# Patient Record
Sex: FEMALE | Race: WHITE | NOT HISPANIC OR LATINO | Employment: OTHER | URBAN - METROPOLITAN AREA
[De-identification: names, ages, dates, MRNs, and addresses within clinical notes are randomized per-mention and may not be internally consistent; named-entity substitution may affect disease eponyms.]

---

## 2017-08-17 RX ORDER — LOSARTAN POTASSIUM 25 MG/1
25 TABLET ORAL DAILY
Qty: 30 TABLET | Refills: 11 | OUTPATIENT
Start: 2017-08-17 | End: 2017-09-21 | Stop reason: SDUPTHER

## 2017-09-21 RX ORDER — LOSARTAN POTASSIUM 25 MG/1
25 TABLET ORAL DAILY
Qty: 90 TABLET | Refills: 0 | Status: SHIPPED | OUTPATIENT
Start: 2017-09-21 | End: 2018-01-12 | Stop reason: SDUPTHER

## 2017-11-27 ENCOUNTER — CLINICAL SUPPORT (OUTPATIENT)
Dept: PRIMARY CARE CLINIC | Facility: CLINIC | Age: 77
End: 2017-11-27
Payer: MEDICARE

## 2017-11-27 DIAGNOSIS — J44.9 CHRONIC OBSTRUCTIVE PULMONARY DISEASE, UNSPECIFIED COPD TYPE: ICD-10-CM

## 2017-11-27 DIAGNOSIS — Z13.29 ENCOUNTER FOR SCREENING FOR OTHER SUSPECTED ENDOCRINE DISORDER: ICD-10-CM

## 2017-11-27 DIAGNOSIS — R73.9 HYPERGLYCEMIA: ICD-10-CM

## 2017-11-27 DIAGNOSIS — I10 ESSENTIAL HYPERTENSION, BENIGN: Primary | ICD-10-CM

## 2017-11-27 DIAGNOSIS — E78.2 MIXED HYPERLIPIDEMIA: ICD-10-CM

## 2017-11-27 LAB
ALBUMIN SERPL BCP-MCNC: 3.8 G/DL
ALP SERPL-CCNC: 95 U/L
ALT SERPL W/O P-5'-P-CCNC: 13 U/L
ANION GAP SERPL CALC-SCNC: 8 MMOL/L
AST SERPL-CCNC: 18 U/L
BASOPHILS # BLD AUTO: 0.08 K/UL
BASOPHILS NFR BLD: 1.2 %
BILIRUB SERPL-MCNC: 0.4 MG/DL
BUN SERPL-MCNC: 21 MG/DL
CALCIUM SERPL-MCNC: 9.6 MG/DL
CHLORIDE SERPL-SCNC: 108 MMOL/L
CHOLEST SERPL-MCNC: 161 MG/DL
CHOLEST/HDLC SERPL: 4.1 {RATIO}
CO2 SERPL-SCNC: 29 MMOL/L
CREAT SERPL-MCNC: 0.8 MG/DL
DIFFERENTIAL METHOD: ABNORMAL
EOSINOPHIL # BLD AUTO: 0.6 K/UL
EOSINOPHIL NFR BLD: 9.5 %
ERYTHROCYTE [DISTWIDTH] IN BLOOD BY AUTOMATED COUNT: 13.7 %
EST. GFR  (AFRICAN AMERICAN): >60 ML/MIN/1.73 M^2
EST. GFR  (NON AFRICAN AMERICAN): >60 ML/MIN/1.73 M^2
ESTIMATED AVG GLUCOSE: 117 MG/DL
GLUCOSE SERPL-MCNC: 103 MG/DL
HBA1C MFR BLD HPLC: 5.7 %
HCT VFR BLD AUTO: 40.3 %
HDLC SERPL-MCNC: 39 MG/DL
HDLC SERPL: 24.2 %
HGB BLD-MCNC: 12.6 G/DL
IMM GRANULOCYTES # BLD AUTO: 0.02 K/UL
IMM GRANULOCYTES NFR BLD AUTO: 0.3 %
LDLC SERPL CALC-MCNC: 88.4 MG/DL
LYMPHOCYTES # BLD AUTO: 1.3 K/UL
LYMPHOCYTES NFR BLD: 18.6 %
MCH RBC QN AUTO: 29 PG
MCHC RBC AUTO-ENTMCNC: 31.3 G/DL
MCV RBC AUTO: 93 FL
MONOCYTES # BLD AUTO: 0.7 K/UL
MONOCYTES NFR BLD: 10.4 %
NEUTROPHILS # BLD AUTO: 4.1 K/UL
NEUTROPHILS NFR BLD: 60 %
NONHDLC SERPL-MCNC: 122 MG/DL
NRBC BLD-RTO: 0 /100 WBC
PLATELET # BLD AUTO: 265 K/UL
PMV BLD AUTO: 10.5 FL
POTASSIUM SERPL-SCNC: 4.6 MMOL/L
PROT SERPL-MCNC: 7.4 G/DL
RBC # BLD AUTO: 4.35 M/UL
SODIUM SERPL-SCNC: 145 MMOL/L
TRIGL SERPL-MCNC: 168 MG/DL
TSH SERPL DL<=0.005 MIU/L-ACNC: 0.91 UIU/ML
WBC # BLD AUTO: 6.76 K/UL

## 2017-11-27 PROCEDURE — 84443 ASSAY THYROID STIM HORMONE: CPT

## 2017-11-27 PROCEDURE — 83036 HEMOGLOBIN GLYCOSYLATED A1C: CPT

## 2017-11-27 PROCEDURE — 85025 COMPLETE CBC W/AUTO DIFF WBC: CPT

## 2017-11-27 PROCEDURE — 80053 COMPREHEN METABOLIC PANEL: CPT

## 2017-11-27 PROCEDURE — 99999 PR PBB SHADOW E&M-EST. PATIENT-LVL II: CPT | Mod: PBBFAC,,,

## 2017-11-27 PROCEDURE — 80061 LIPID PANEL: CPT

## 2018-01-09 ENCOUNTER — OFFICE VISIT (OUTPATIENT)
Dept: PRIMARY CARE CLINIC | Facility: CLINIC | Age: 78
End: 2018-01-09
Payer: MEDICARE

## 2018-01-09 VITALS
WEIGHT: 133.38 LBS | DIASTOLIC BLOOD PRESSURE: 65 MMHG | SYSTOLIC BLOOD PRESSURE: 133 MMHG | RESPIRATION RATE: 18 BRPM | BODY MASS INDEX: 23.63 KG/M2 | TEMPERATURE: 98 F | HEIGHT: 63 IN | OXYGEN SATURATION: 87 % | HEART RATE: 73 BPM

## 2018-01-09 DIAGNOSIS — J44.9 CHRONIC OBSTRUCTIVE PULMONARY DISEASE, UNSPECIFIED COPD TYPE: ICD-10-CM

## 2018-01-09 DIAGNOSIS — M89.8X1 SHOULDER BLADE PAIN: ICD-10-CM

## 2018-01-09 DIAGNOSIS — R07.81 PLEURITIC CHEST PAIN: Primary | ICD-10-CM

## 2018-01-09 PROCEDURE — 99999 PR PBB SHADOW E&M-EST. PATIENT-LVL IV: CPT | Mod: PBBFAC,,, | Performed by: INTERNAL MEDICINE

## 2018-01-09 PROCEDURE — 96372 THER/PROPH/DIAG INJ SC/IM: CPT | Mod: S$GLB,,, | Performed by: INTERNAL MEDICINE

## 2018-01-09 PROCEDURE — 99213 OFFICE O/P EST LOW 20 MIN: CPT | Mod: 25,S$GLB,, | Performed by: INTERNAL MEDICINE

## 2018-01-09 RX ORDER — BETAMETHASONE SODIUM PHOSPHATE AND BETAMETHASONE ACETATE 3; 3 MG/ML; MG/ML
12 INJECTION, SUSPENSION INTRA-ARTICULAR; INTRALESIONAL; INTRAMUSCULAR; SOFT TISSUE
Status: COMPLETED | OUTPATIENT
Start: 2018-01-09 | End: 2018-01-09

## 2018-01-09 RX ORDER — PREDNISONE 20 MG/1
20 TABLET ORAL 2 TIMES DAILY
Qty: 14 TABLET | Refills: 0 | Status: SHIPPED | OUTPATIENT
Start: 2018-01-09 | End: 2018-01-16

## 2018-01-09 RX ORDER — KETOROLAC TROMETHAMINE 30 MG/ML
30 INJECTION, SOLUTION INTRAMUSCULAR; INTRAVENOUS
Status: COMPLETED | OUTPATIENT
Start: 2018-01-09 | End: 2018-01-09

## 2018-01-09 RX ADMIN — BETAMETHASONE SODIUM PHOSPHATE AND BETAMETHASONE ACETATE 12 MG: 3; 3 INJECTION, SUSPENSION INTRA-ARTICULAR; INTRALESIONAL; INTRAMUSCULAR; SOFT TISSUE at 10:01

## 2018-01-09 RX ADMIN — KETOROLAC TROMETHAMINE 30 MG: 30 INJECTION, SOLUTION INTRAMUSCULAR; INTRAVENOUS at 10:01

## 2018-01-09 NOTE — PROGRESS NOTES
Patient ID by name and . Celestone 12 mg IM injection given in right ventrogluteal and Ketorolac 30 mg IM injection given in left ventrogluteal using aseptic technique. Aspirated with no blood noted. Patient tolerated well. Given per physicians order. No adverse reactions noted.

## 2018-01-11 NOTE — PROGRESS NOTES
Subjective:       Patient ID: Ann Marie Hirsch is a 77 y.o. female.    Chief Complaint: Routine check up and ( R ) Shoulder blade pain    HPI  Pt c/o rt shoulder blade pain in the back radiate rt arm had CABG 2015 no sob cp hurt when deep breath or move rt shoulder went to ER had xray labs yold CXR ok sned home has been hurting several weeks not better  Review of Systems    Objective:      Physical Exam   Constitutional: She is oriented to person, place, and time. She appears well-developed and well-nourished. No distress.   HENT:   Head: Normocephalic and atraumatic.   Right Ear: External ear normal.   Left Ear: External ear normal.   Nose: Nose normal.   Mouth/Throat: Oropharynx is clear and moist. No oropharyngeal exudate.   Eyes: Conjunctivae and EOM are normal. Pupils are equal, round, and reactive to light. Right eye exhibits no discharge. Left eye exhibits no discharge.   Neck: Normal range of motion. Neck supple. No thyromegaly present.   Cardiovascular: Normal rate, regular rhythm, normal heart sounds and intact distal pulses.  Exam reveals no gallop and no friction rub.    No murmur heard.  Pulmonary/Chest: Effort normal and breath sounds normal. No respiratory distress. She has no wheezes. She has no rales. She exhibits no tenderness.   Abdominal: Soft. Bowel sounds are normal. She exhibits no distension. There is no tenderness. There is no rebound and no guarding.   Musculoskeletal: Normal range of motion. She exhibits tenderness (tenderness medial shoulder blade with palpation no swell no redness). She exhibits no edema or deformity.   Lymphadenopathy:     She has no cervical adenopathy.   Neurological: She is alert and oriented to person, place, and time.   Skin: Skin is warm and dry. Capillary refill takes less than 2 seconds. No rash noted. No erythema.   Psychiatric: She has a normal mood and affect. Judgment and thought content normal.   Nursing note and vitals reviewed.      Assessment:       1.  Pleuritic chest pain        Plan:       Pleuritic chest pain  -     betamethasone acetate-betamethasone sodium phosphate injection 12 mg; Inject 2 mLs (12 mg total) into the muscle one time.  -     ketorolac injection 30 mg; Inject 30 mg into the muscle one time.  -     predniSONE (DELTASONE) 20 MG tablet; Take 1 tablet (20 mg total) by mouth 2 (two) times daily.  Dispense: 14 tablet; Refill: 0

## 2018-01-12 NOTE — TELEPHONE ENCOUNTER
----- Message from Felicity Cabrera sent at 1/12/2018 12:13 PM CST -----  Contact: Patient  Ann Marie, patient [phone], Calling for refill on Rx losartan (COZAAR) 25 MG tablet 90 tablet. Please advise. Thanks.      [facility] - [facility], [place] - [address]  [address] [facility] [address]  Phone: [phone] Fax: [phone]

## 2018-01-13 RX ORDER — LOSARTAN POTASSIUM 25 MG/1
25 TABLET ORAL DAILY
Qty: 90 TABLET | Refills: 0 | Status: SHIPPED | OUTPATIENT
Start: 2018-01-13 | End: 2018-04-09 | Stop reason: SDUPTHER

## 2018-04-09 ENCOUNTER — OFFICE VISIT (OUTPATIENT)
Dept: PRIMARY CARE CLINIC | Facility: CLINIC | Age: 78
End: 2018-04-09
Payer: MEDICARE

## 2018-04-09 VITALS
SYSTOLIC BLOOD PRESSURE: 135 MMHG | BODY MASS INDEX: 23.9 KG/M2 | DIASTOLIC BLOOD PRESSURE: 78 MMHG | HEART RATE: 73 BPM | TEMPERATURE: 99 F | HEIGHT: 63 IN | OXYGEN SATURATION: 93 % | RESPIRATION RATE: 18 BRPM | WEIGHT: 134.88 LBS

## 2018-04-09 DIAGNOSIS — Z95.1 S/P CABG (CORONARY ARTERY BYPASS GRAFT): ICD-10-CM

## 2018-04-09 DIAGNOSIS — J44.9 CHRONIC OBSTRUCTIVE PULMONARY DISEASE, UNSPECIFIED COPD TYPE: ICD-10-CM

## 2018-04-09 DIAGNOSIS — I10 ESSENTIAL HYPERTENSION, BENIGN: Primary | ICD-10-CM

## 2018-04-09 PROCEDURE — 99213 OFFICE O/P EST LOW 20 MIN: CPT | Mod: S$GLB,,, | Performed by: INTERNAL MEDICINE

## 2018-04-09 PROCEDURE — 99999 PR PBB SHADOW E&M-EST. PATIENT-LVL III: CPT | Mod: PBBFAC,,, | Performed by: INTERNAL MEDICINE

## 2018-04-09 PROCEDURE — 3075F SYST BP GE 130 - 139MM HG: CPT | Mod: CPTII,S$GLB,, | Performed by: INTERNAL MEDICINE

## 2018-04-09 PROCEDURE — 3078F DIAST BP <80 MM HG: CPT | Mod: CPTII,S$GLB,, | Performed by: INTERNAL MEDICINE

## 2018-04-09 RX ORDER — LOSARTAN POTASSIUM 25 MG/1
25 TABLET ORAL DAILY
Qty: 90 TABLET | Refills: 3 | Status: SHIPPED | OUTPATIENT
Start: 2018-04-09 | End: 2019-01-11 | Stop reason: SDUPTHER

## 2018-04-09 NOTE — PROGRESS NOTES
Subjective:       Patient ID: Ann Marie Hirsch is a 78 y.o. female.    Chief Complaint: Annual Exam    HPI  Pt c/o doing well no new problems try get off oxygen but desaturation after 5 minutes not able to afford Symbicort CVXR better last visit does not want to do mammogram hurt too much no sob cp no fever chill no new c/o pt nee refill on BP med and see Dr Andrade for cardiac had CABG 2015 stable  Review of Systems    Objective:      Physical Exam   Constitutional: She is oriented to person, place, and time. She appears well-developed and well-nourished. No distress.   On portable oxygen   HENT:   Head: Normocephalic and atraumatic.   Right Ear: External ear normal.   Left Ear: External ear normal.   Nose: Nose normal.   Mouth/Throat: Oropharynx is clear and moist. No oropharyngeal exudate.   Eyes: Conjunctivae and EOM are normal. Pupils are equal, round, and reactive to light. Right eye exhibits no discharge. Left eye exhibits no discharge.   Neck: Normal range of motion. Neck supple. No thyromegaly present.   Cardiovascular: Normal rate, regular rhythm, normal heart sounds and intact distal pulses.  Exam reveals no gallop and no friction rub.    No murmur heard.  Pulmonary/Chest: Effort normal. No respiratory distress. She has no wheezes. She has no rales. She exhibits no tenderness.   Decrease BS bilaterally   Abdominal: Soft. Bowel sounds are normal. She exhibits no distension. There is no tenderness. There is no rebound and no guarding.   Musculoskeletal: Normal range of motion. She exhibits no edema, tenderness or deformity.   Lymphadenopathy:     She has no cervical adenopathy.   Neurological: She is alert and oriented to person, place, and time.   Skin: Skin is warm and dry. Capillary refill takes less than 2 seconds. No rash noted. No erythema.   Psychiatric: She has a normal mood and affect. Judgment and thought content normal.   Nursing note and vitals reviewed.      Assessment:       1. Essential  hypertension, benign    2. Chronic obstructive pulmonary disease, unspecified COPD type    3. S/P CABG (coronary artery bypass graft)        Plan:       Essential hypertension, benign  -     losartan (COZAAR) 25 MG tablet; Take 1 tablet (25 mg total) by mouth once daily.  Dispense: 90 tablet; Refill: 3    Chronic obstructive pulmonary disease, unspecified COPD type  Comments:  neeed to find out which inhaler ijnsurance cover for COPD exacerbation prevention    S/P CABG (coronary artery bypass graft)  Comments:  f/u with Dr Al

## 2018-07-16 ENCOUNTER — OFFICE VISIT (OUTPATIENT)
Dept: PRIMARY CARE CLINIC | Facility: CLINIC | Age: 78
End: 2018-07-16
Payer: MEDICARE

## 2018-07-16 VITALS
HEART RATE: 67 BPM | HEIGHT: 63 IN | BODY MASS INDEX: 24.51 KG/M2 | DIASTOLIC BLOOD PRESSURE: 73 MMHG | RESPIRATION RATE: 18 BRPM | TEMPERATURE: 98 F | OXYGEN SATURATION: 96 % | SYSTOLIC BLOOD PRESSURE: 116 MMHG | WEIGHT: 138.31 LBS

## 2018-07-16 DIAGNOSIS — Z23 NEED FOR SHINGLES VACCINE: Primary | ICD-10-CM

## 2018-07-16 DIAGNOSIS — R20.2 PARESTHESIAS: ICD-10-CM

## 2018-07-16 PROCEDURE — 99213 OFFICE O/P EST LOW 20 MIN: CPT | Mod: S$GLB,,, | Performed by: INTERNAL MEDICINE

## 2018-07-16 PROCEDURE — 3074F SYST BP LT 130 MM HG: CPT | Mod: CPTII,S$GLB,, | Performed by: INTERNAL MEDICINE

## 2018-07-16 PROCEDURE — 99999 PR PBB SHADOW E&M-EST. PATIENT-LVL III: CPT | Mod: PBBFAC,,, | Performed by: INTERNAL MEDICINE

## 2018-07-16 PROCEDURE — 3078F DIAST BP <80 MM HG: CPT | Mod: CPTII,S$GLB,, | Performed by: INTERNAL MEDICINE

## 2018-07-17 NOTE — PROGRESS NOTES
Subjective:       Patient ID: Ann Marie Hirsch is a 78 y.o. female.    Chief Complaint: Burning sensation on abdomen    HPI  Pt  C/o  c/o sever burning sensation rt side below rt breast 2 weeks ago no rash no heat had cholecystectomy no sob cp no fever chill  Review of Systems    Objective:      Physical Exam   Constitutional: She is oriented to person, place, and time. She appears well-developed and well-nourished. No distress.   HENT:   Head: Normocephalic and atraumatic.   Right Ear: External ear normal.   Left Ear: External ear normal.   Nose: Nose normal.   Mouth/Throat: Oropharynx is clear and moist. No oropharyngeal exudate.   Eyes: Conjunctivae and EOM are normal. Pupils are equal, round, and reactive to light. Right eye exhibits no discharge. Left eye exhibits no discharge.   Neck: Normal range of motion. Neck supple. No thyromegaly present.   Cardiovascular: Normal rate, regular rhythm, normal heart sounds and intact distal pulses.  Exam reveals no gallop and no friction rub.    No murmur heard.  Pulmonary/Chest: Effort normal and breath sounds normal. No respiratory distress. She has no wheezes. She has no rales. She exhibits no tenderness.   Home oxygen   Abdominal: Soft. Bowel sounds are normal. She exhibits no distension. There is no tenderness. There is no rebound and no guarding.   Musculoskeletal: Normal range of motion. She exhibits no edema, tenderness or deformity.   Lymphadenopathy:     She has no cervical adenopathy.   Neurological: She is alert and oriented to person, place, and time.   Skin: Skin is warm and dry. Capillary refill takes less than 2 seconds. No rash noted. No erythema.   Rt breast rt chest no rash no pain back normal   Psychiatric: She has a normal mood and affect. Judgment and thought content normal.   Nursing note and vitals reviewed.      Assessment:       1. Need for shingles vaccine    2. Paresthesias        Plan:       Need for shingles vaccine  -     Discontinue: zoster  vaccine live, PF, (ZOSTAVAX, PF,) 19,400 unit/0.65 mL injection; Inject 19,400 Units into the skin once. for 1 dose  Dispense: 1 vial; Refill: 0  -     zoster vaccine live, PF, (ZOSTAVAX, PF,) 19,400 unit/0.65 mL injection; Inject 19,400 Units into the skin once. for 1 dose  Dispense: 1 vial; Refill: 0    Paresthesias  Comments:  rt lower chest since symptoms resolved and no rash no pain will observe for now and get Herpeszoster vaccib=eninsurance only cover pharmacy

## 2019-01-11 DIAGNOSIS — I10 ESSENTIAL HYPERTENSION, BENIGN: ICD-10-CM

## 2019-01-11 RX ORDER — LOSARTAN POTASSIUM 25 MG/1
TABLET ORAL
Qty: 90 TABLET | Refills: 3 | Status: SHIPPED | OUTPATIENT
Start: 2019-01-11 | End: 2023-04-26

## 2019-12-17 ENCOUNTER — TELEPHONE (OUTPATIENT)
Dept: PULMONOLOGY | Facility: CLINIC | Age: 79
End: 2019-12-17

## 2019-12-17 DIAGNOSIS — J44.9 CHRONIC OBSTRUCTIVE PULMONARY DISEASE, UNSPECIFIED COPD TYPE: Primary | ICD-10-CM

## 2019-12-24 ENCOUNTER — PATIENT OUTREACH (OUTPATIENT)
Dept: ADMINISTRATIVE | Facility: OTHER | Age: 79
End: 2019-12-24

## 2019-12-27 ENCOUNTER — TELEPHONE (OUTPATIENT)
Dept: PULMONOLOGY | Facility: CLINIC | Age: 79
End: 2019-12-27

## 2019-12-27 ENCOUNTER — OFFICE VISIT (OUTPATIENT)
Dept: PULMONOLOGY | Facility: CLINIC | Age: 79
End: 2019-12-27
Payer: MEDICARE

## 2019-12-27 VITALS
DIASTOLIC BLOOD PRESSURE: 67 MMHG | WEIGHT: 137.88 LBS | OXYGEN SATURATION: 92 % | SYSTOLIC BLOOD PRESSURE: 128 MMHG | HEIGHT: 63 IN | BODY MASS INDEX: 24.43 KG/M2 | HEART RATE: 73 BPM

## 2019-12-27 DIAGNOSIS — J43.8 OTHER EMPHYSEMA: ICD-10-CM

## 2019-12-27 DIAGNOSIS — J96.11 CHRONIC RESPIRATORY FAILURE WITH HYPOXIA: ICD-10-CM

## 2019-12-27 PROCEDURE — 3078F DIAST BP <80 MM HG: CPT | Mod: CPTII,S$GLB,, | Performed by: NURSE PRACTITIONER

## 2019-12-27 PROCEDURE — 99204 PR OFFICE/OUTPT VISIT, NEW, LEVL IV, 45-59 MIN: ICD-10-PCS | Mod: S$GLB,,, | Performed by: NURSE PRACTITIONER

## 2019-12-27 PROCEDURE — 99999 PR PBB SHADOW E&M-EST. PATIENT-LVL III: CPT | Mod: PBBFAC,,, | Performed by: NURSE PRACTITIONER

## 2019-12-27 PROCEDURE — 3074F SYST BP LT 130 MM HG: CPT | Mod: CPTII,S$GLB,, | Performed by: NURSE PRACTITIONER

## 2019-12-27 PROCEDURE — 1159F PR MEDICATION LIST DOCUMENTED IN MEDICAL RECORD: ICD-10-PCS | Mod: S$GLB,,, | Performed by: NURSE PRACTITIONER

## 2019-12-27 PROCEDURE — 99204 OFFICE O/P NEW MOD 45 MIN: CPT | Mod: S$GLB,,, | Performed by: NURSE PRACTITIONER

## 2019-12-27 PROCEDURE — 1126F PR PAIN SEVERITY QUANTIFIED, NO PAIN PRESENT: ICD-10-PCS | Mod: S$GLB,,, | Performed by: NURSE PRACTITIONER

## 2019-12-27 PROCEDURE — 1159F MED LIST DOCD IN RCRD: CPT | Mod: S$GLB,,, | Performed by: NURSE PRACTITIONER

## 2019-12-27 PROCEDURE — 1101F PR PT FALLS ASSESS DOC 0-1 FALLS W/OUT INJ PAST YR: ICD-10-PCS | Mod: CPTII,S$GLB,, | Performed by: NURSE PRACTITIONER

## 2019-12-27 PROCEDURE — 1101F PT FALLS ASSESS-DOCD LE1/YR: CPT | Mod: CPTII,S$GLB,, | Performed by: NURSE PRACTITIONER

## 2019-12-27 PROCEDURE — 3074F PR MOST RECENT SYSTOLIC BLOOD PRESSURE < 130 MM HG: ICD-10-PCS | Mod: CPTII,S$GLB,, | Performed by: NURSE PRACTITIONER

## 2019-12-27 PROCEDURE — 99999 PR PBB SHADOW E&M-EST. PATIENT-LVL III: ICD-10-PCS | Mod: PBBFAC,,, | Performed by: NURSE PRACTITIONER

## 2019-12-27 PROCEDURE — 3078F PR MOST RECENT DIASTOLIC BLOOD PRESSURE < 80 MM HG: ICD-10-PCS | Mod: CPTII,S$GLB,, | Performed by: NURSE PRACTITIONER

## 2019-12-27 PROCEDURE — 1126F AMNT PAIN NOTED NONE PRSNT: CPT | Mod: S$GLB,,, | Performed by: NURSE PRACTITIONER

## 2019-12-27 RX ORDER — ROSUVASTATIN CALCIUM 10 MG/1
TABLET, COATED ORAL
COMMUNITY
Start: 2019-12-17 | End: 2023-07-24

## 2019-12-27 RX ORDER — OMEPRAZOLE 20 MG/1
20 CAPSULE, DELAYED RELEASE ORAL DAILY
Refills: 3 | COMMUNITY
Start: 2019-12-06 | End: 2023-04-26 | Stop reason: SDUPTHER

## 2019-12-27 RX ORDER — NITROGLYCERIN 0.4 MG/1
TABLET SUBLINGUAL
Refills: 3 | COMMUNITY
Start: 2019-11-15 | End: 2023-11-28 | Stop reason: SDUPTHER

## 2019-12-27 NOTE — PATIENT INSTRUCTIONS
Try using the Stiolto 2 puffs once daily  Let me know if it is not covered by insurance or too expensive.    Call us at [phone] and ask for Satish or Chery if you have any problems

## 2019-12-27 NOTE — TELEPHONE ENCOUNTER
----- Message from Katerin Johnson sent at 12/27/2019  2:04 PM CST -----  Contact: Self [phone]  Pt states her medication was $45 tiotropium-olodaterol (STIOLTO RESPIMAT) 2.5-2.5 mcg/actuation Mist please call back to discuss

## 2019-12-27 NOTE — LETTER
December 27, 2019      Nahum Chaney MD  [address] [place] [address]           [facility]  [address] [facility] [address]  [place] [address]  Phone: [phone]  Fax: [phone]          Patient: Ann Marie Hirsch   MR Number: [id number]   YOB: 1940   Date of Visit: 12/27/2019       Dear Dr. Nahum Chaney:    Thank you for referring Ann Marie Hirsch to me for evaluation. Attached you will find relevant portions of my assessment and plan of care.    If you have questions, please do not hesitate to call me. I look forward to following Ann Marie Hirsch along with you.    Sincerely,    Nga Martinez, DNP    Enclosure  CC:  No Recipients    If you would like to receive this communication electronically, please contact [email or web address] or [phone] to request more information on [employer] Link access.    For providers and/or their staff who would like to refer a patient to [facility], please contact us through our one-stop-shop provider referral line, [facility], at [phone].    If you feel you have received this communication in error or would no longer like to receive these types of communications, please e-mail [email or web address]

## 2019-12-27 NOTE — ASSESSMENT & PLAN NOTE
Trial Stiolto.  No recent need for steroids so will forgo ICS at this time. Tried to obtain coupon but patient has no email address to provide.

## 2019-12-27 NOTE — PROGRESS NOTES
Subjective:       Patient ID: Ann Marie Hirsch is a 79 y.o. female.    Chief Complaint: COPD    HPI:   Ann Marie Hirsch is a 79 y.o. female who presents with evaluation for COPD.  Seen by Dr. Mckeon in the past but is new to me.   On supplemental oxygen for 2 years-laila.  Had CABG in ,  Started smoking at age 15, quit 2013 about a pack a day.  No lung problems personally or in the family.   Does fluctuate by a couple of pounds.  Uses nebulizer twice a day with no relief  Had PFTs a couple of years ago and was prescribed inhalers but they were not covered by insurance.    No needs for steroids or abx.   Not interested in completing PFTs at this time.       Review of Systems   Constitutional: Negative for chills, activity change, fatigue and night sweats.   HENT: Negative for postnasal drip, rhinorrhea, trouble swallowing and congestion.    Eyes: Negative for itching.   Respiratory: Positive for dyspnea on extertion (with even light housework or long walking). Negative for cough, hemoptysis, sputum production, choking, chest tightness, shortness of breath, wheezing and use of rescue inhaler.    Cardiovascular: Negative for chest pain and palpitations.   Genitourinary: Negative for difficulty urinating.   Endocrine: Negative for cold intolerance and heat intolerance.    Musculoskeletal: Negative for arthralgias.   Skin: Negative for rash.   Gastrointestinal: Negative for nausea, vomiting and acid reflux.   Neurological: Negative for dizziness and light-headedness.   Hematological: Negative for adenopathy.   Psychiatric/Behavioral: Negative for sleep disturbance.         Social History     Tobacco Use    Smoking status: Former Smoker     Last attempt to quit: 6/3/2012     Years since quittin.5    Smokeless tobacco: Never Used   Substance Use Topics    Alcohol use: No       Review of patient's allergies indicates:  No Known Allergies  Past Medical History:   Diagnosis Date    COPD (chronic obstructive pulmonary  disease)     Hyperlipidemia     Hypertension      Past Surgical History:   Procedure Laterality Date    gall stone      HEMORRHOID SURGERY      open heart surgery       Current Outpatient Medications on File Prior to Visit   Medication Sig    aspirin (ECOTRIN) 81 MG EC tablet Take 81 mg by mouth once daily.    ipratropium (ATROVENT) 0.02 % nebulizer solution     levalbuterol (XOPENEX) 1.25 mg/3 mL nebulizer solution     losartan (COZAAR) 25 MG tablet TAKE ONE TABLET BY MOUTH ONCE DAILY    metoprolol succinate (TOPROL-XL) 50 MG 24 hr tablet     nitroGLYCERIN (NITROSTAT) 0.4 MG SL tablet PLACE 1 TAB UNDER TONGUE FOR CHEST PAIN EVERY 5 MINUTES UP TO 3 DOSES GO TO ER IF PAIN NOT RELIEVED    omeprazole (PRILOSEC) 20 MG capsule Take 20 mg by mouth once daily.    ranitidine (ZANTAC) 150 MG capsule Take 150 mg by mouth 2 (two) times daily.    rosuvastatin (CRESTOR) 10 MG tablet     lovastatin (MEVACOR) 20 MG tablet      No current facility-administered medications on file prior to visit.        Objective:      Vitals:    12/27/19 0946   BP: 128/67   Pulse: 73     Physical Exam   Constitutional: She is oriented to person, place, and time. She appears well-developed and well-nourished. No distress.   On supplemental oxygen   HENT:   Head: Normocephalic.   Neck: Normal range of motion. Neck supple.   Cardiovascular: Normal rate and regular rhythm.   No murmur heard.  Pulmonary/Chest: Normal expansion, symmetric chest wall expansion and effort normal. No respiratory distress. She has decreased breath sounds. She has no wheezes. She has no rhonchi. She has no rales.   Abdominal: Soft. She exhibits no distension. There is no hepatosplenomegaly. There is no tenderness.   Musculoskeletal: Normal range of motion. She exhibits no edema.   Lymphadenopathy:     She has no cervical adenopathy.   Neurological: She is alert and oriented to person, place, and time. Gait normal.   Skin: Skin is warm and dry. No cyanosis.  Nails show no clubbing.   Psychiatric: She has a normal mood and affect.   Vitals reviewed.    Personal Diagnostic Review  PFTs personally reviewed and discussed with patient from 2016  Imaging personally reviewed with patient CXR 4/8/19        Pulmonary Function Tests 12/27/2019   SpO2 92   Height 63.000   Weight 2206.36   BMI (Calculated) 24.4   Some recent data might be hidden         Assessment:     Problem List Items Addressed This Visit        Pulmonary    Other emphysema    Overview     Using PRN nebs, no maintenance therapy in a few years.  Tried Anoro- unable to afford  Not interested in completing PFTs         Current Assessment & Plan     Trial Stiolto.  No recent need for steroids so will forgo ICS at this time. Tried to obtain coupon but patient has no email address to provide.          Chronic respiratory failure with hypoxia    Overview     On 2-3 L NC         Current Assessment & Plan     Continue as now             Patient ahs not had a CT chest to screen for cancer, will discuss at next visit.

## 2019-12-30 ENCOUNTER — TELEPHONE (OUTPATIENT)
Dept: PULMONOLOGY | Facility: CLINIC | Age: 79
End: 2019-12-30

## 2020-04-30 ENCOUNTER — TELEPHONE (OUTPATIENT)
Dept: PULMONOLOGY | Facility: CLINIC | Age: 80
End: 2020-04-30

## 2020-05-08 ENCOUNTER — TELEPHONE (OUTPATIENT)
Dept: PULMONOLOGY | Facility: CLINIC | Age: 80
End: 2020-05-08

## 2020-05-08 NOTE — TELEPHONE ENCOUNTER
The pt states that she would like a call on Monday when her daughter present to set the appointment.

## 2020-06-19 ENCOUNTER — OFFICE VISIT (OUTPATIENT)
Dept: PULMONOLOGY | Facility: CLINIC | Age: 80
End: 2020-06-19
Payer: MEDICARE

## 2020-06-19 VITALS — WEIGHT: 133.69 LBS | OXYGEN SATURATION: 99 % | HEIGHT: 63 IN | HEART RATE: 64 BPM | BODY MASS INDEX: 23.69 KG/M2

## 2020-06-19 DIAGNOSIS — J96.11 CHRONIC RESPIRATORY FAILURE WITH HYPOXIA: ICD-10-CM

## 2020-06-19 DIAGNOSIS — J43.8 OTHER EMPHYSEMA: ICD-10-CM

## 2020-06-19 DIAGNOSIS — R06.00 DYSPNEA, UNSPECIFIED TYPE: ICD-10-CM

## 2020-06-19 PROCEDURE — 1159F MED LIST DOCD IN RCRD: CPT | Mod: S$GLB,,, | Performed by: NURSE PRACTITIONER

## 2020-06-19 PROCEDURE — 1101F PT FALLS ASSESS-DOCD LE1/YR: CPT | Mod: CPTII,S$GLB,, | Performed by: NURSE PRACTITIONER

## 2020-06-19 PROCEDURE — 1159F PR MEDICATION LIST DOCUMENTED IN MEDICAL RECORD: ICD-10-PCS | Mod: S$GLB,,, | Performed by: NURSE PRACTITIONER

## 2020-06-19 PROCEDURE — 99999 PR PBB SHADOW E&M-EST. PATIENT-LVL IV: ICD-10-PCS | Mod: PBBFAC,,, | Performed by: NURSE PRACTITIONER

## 2020-06-19 PROCEDURE — 99213 PR OFFICE/OUTPT VISIT, EST, LEVL III, 20-29 MIN: ICD-10-PCS | Mod: S$GLB,,, | Performed by: NURSE PRACTITIONER

## 2020-06-19 PROCEDURE — 99213 OFFICE O/P EST LOW 20 MIN: CPT | Mod: S$GLB,,, | Performed by: NURSE PRACTITIONER

## 2020-06-19 PROCEDURE — 1101F PR PT FALLS ASSESS DOC 0-1 FALLS W/OUT INJ PAST YR: ICD-10-PCS | Mod: CPTII,S$GLB,, | Performed by: NURSE PRACTITIONER

## 2020-06-19 PROCEDURE — 1126F PR PAIN SEVERITY QUANTIFIED, NO PAIN PRESENT: ICD-10-PCS | Mod: S$GLB,,, | Performed by: NURSE PRACTITIONER

## 2020-06-19 PROCEDURE — 99999 PR PBB SHADOW E&M-EST. PATIENT-LVL IV: CPT | Mod: PBBFAC,,, | Performed by: NURSE PRACTITIONER

## 2020-06-19 PROCEDURE — 1126F AMNT PAIN NOTED NONE PRSNT: CPT | Mod: S$GLB,,, | Performed by: NURSE PRACTITIONER

## 2020-06-19 RX ORDER — FLUTICASONE FUROATE, UMECLIDINIUM BROMIDE AND VILANTEROL TRIFENATATE 100; 62.5; 25 UG/1; UG/1; UG/1
POWDER RESPIRATORY (INHALATION)
Status: ON HOLD | COMMUNITY
Start: 2020-06-16 | End: 2023-01-02

## 2020-06-19 RX ORDER — ALBUTEROL SULFATE 90 UG/1
1 AEROSOL, METERED RESPIRATORY (INHALATION) DAILY
Qty: 18 G | Refills: 6 | Status: SHIPPED | OUTPATIENT
Start: 2020-06-19 | End: 2023-04-26

## 2020-06-19 RX ORDER — PREDNISONE 20 MG/1
TABLET ORAL
Status: ON HOLD | COMMUNITY
Start: 2020-06-16 | End: 2023-01-04 | Stop reason: HOSPADM

## 2020-06-19 NOTE — PROGRESS NOTES
Subjective:       Patient ID: Ann Marie Hirsch is a 80 y.o. female.    Chief Complaint: Follow-up    HPI:   Ann Marie Hirsch is a 80 y.o. female who presents with COPD follow up  Has a lot of shortness of breath with a lot of walking.  Has difficulty doing housework. Started getting worse about 3 weeks.  Had been feeling pretty well  Uses nebs BID with good relief  Just started Trelegy 2 days ago      Review of Systems   Constitutional: Negative for fever, chills, fatigue and night sweats.   HENT: Negative for postnasal drip and congestion.    Respiratory: Positive for shortness of breath and dyspnea on extertion. Negative for sputum production, chest tightness, wheezing and use of rescue inhaler.    Cardiovascular: Negative for leg swelling.   Psychiatric/Behavioral: Negative for sleep disturbance.         Social History     Tobacco Use    Smoking status: Former Smoker     Quit date: 6/3/2012     Years since quittin.0    Smokeless tobacco: Never Used   Substance Use Topics    Alcohol use: No       Review of patient's allergies indicates:  No Known Allergies  Past Medical History:   Diagnosis Date    COPD (chronic obstructive pulmonary disease)     Hyperlipidemia     Hypertension      Past Surgical History:   Procedure Laterality Date    gall stone      HEMORRHOID SURGERY      open heart surgery       Current Outpatient Medications on File Prior to Visit   Medication Sig    aspirin (ECOTRIN) 81 MG EC tablet Take 81 mg by mouth once daily.    ipratropium (ATROVENT) 0.02 % nebulizer solution     levalbuterol (XOPENEX) 1.25 mg/3 mL nebulizer solution     losartan (COZAAR) 25 MG tablet TAKE ONE TABLET BY MOUTH ONCE DAILY    metoprolol succinate (TOPROL-XL) 50 MG 24 hr tablet     nitroGLYCERIN (NITROSTAT) 0.4 MG SL tablet PLACE 1 TAB UNDER TONGUE FOR CHEST PAIN EVERY 5 MINUTES UP TO 3 DOSES GO TO ER IF PAIN NOT RELIEVED    omeprazole (PRILOSEC) 20 MG capsule Take 20 mg by mouth once daily.     predniSONE (DELTASONE) 20 MG tablet     rosuvastatin (CRESTOR) 10 MG tablet     TRELEGY ELLIPTA 100-62.5-25 mcg DsDv INHALE ONE PUFFS ONCE DAILY AS DIRECTED    lovastatin (MEVACOR) 20 MG tablet     ranitidine (ZANTAC) 150 MG capsule Take 150 mg by mouth 2 (two) times daily.    tiotropium-olodaterol (STIOLTO RESPIMAT) 2.5-2.5 mcg/actuation Mist Inhale 2 puffs into the lungs once daily. Controller     No current facility-administered medications on file prior to visit.        Objective:      Vitals:    06/19/20 1139   Pulse: 64     Physical Exam   Constitutional: She is oriented to person, place, and time. She appears well-developed and well-nourished. No distress.   HENT:   Head: Normocephalic.   Neck: Normal range of motion. Neck supple.   Cardiovascular: Normal rate.   Pulmonary/Chest: Normal expansion. No respiratory distress. She has no decreased breath sounds. She has no wheezes. She has no rales.   Abdominal: Soft.   Musculoskeletal: Normal range of motion.         General: No edema.   Lymphadenopathy:     She has no axillary adenopathy.   Neurological: She is alert and oriented to person, place, and time. Gait normal.   Skin: Skin is warm and dry. She is not diaphoretic. No erythema. Nails show no clubbing.   Psychiatric: She has a normal mood and affect.   Nursing note and vitals reviewed.    Personal Diagnostic Review    Imaging personally reviewed with patient CXR 6/18/20  PFTs personally reviewed and discussed with patient      Pulmonary Function Tests 6/19/2020   SpO2 99   Height 63   Weight 2139.34   BMI (Calculated) 23.7   Some recent data might be hidden         Assessment:     Problem List Items Addressed This Visit        Pulmonary    Other emphysema    Overview     Just started Trelegy, finds it is working  Continue PRN MISAEL, inhaler ordered for her to carry for PRN use  Recommend CT chest at some point- patient would like to wait a bit before completing due to pandemic concerns            Current Assessment & Plan     As now- strongly recommend she continue Trelegy as trible therapy is indicated after seeing the severe obstruction on her PFTs  CXR clear         Chronic respiratory failure with hypoxia    Overview     On 2-3 L NC         Current Assessment & Plan     Continue as now           Other Visit Diagnoses     Dyspnea, unspecified type        Relevant Orders    CT Chest Without Contrast

## 2020-06-19 NOTE — ASSESSMENT & PLAN NOTE
As now- strongly recommend she continue Trelegy as trible therapy is indicated after seeing the severe obstruction on her PFTs  CXR clear

## 2020-06-19 NOTE — PATIENT INSTRUCTIONS
Keep using your Trelegy one puff each day, rinse mouth after use    Get your CT scan when you can    Keep me posted on how you are!

## 2020-10-02 ENCOUNTER — TELEPHONE (OUTPATIENT)
Dept: PRIMARY CARE CLINIC | Facility: CLINIC | Age: 80
End: 2020-10-02

## 2020-10-02 NOTE — TELEPHONE ENCOUNTER
----- Message from Annamaria Johnson sent at 10/2/2020  1:09 PM CDT -----  Contact: Mona/[facility]/ [phone]  Patient need a new CMN  form for her oxygen. Please sign and fax back over.

## 2020-10-05 ENCOUNTER — TELEPHONE (OUTPATIENT)
Dept: PRIMARY CARE CLINIC | Facility: CLINIC | Age: 80
End: 2020-10-05

## 2020-10-05 NOTE — TELEPHONE ENCOUNTER
----- Message from Felicity Cabrera sent at 10/5/2020  9:48 AM CDT -----  Contact: Mona with [facility] phone [phone] fax [phone]  Mona with [facility] phone [phone] fax [phone], Calling to inquire about the CMN for oxygen. Please call her. Thanks.

## 2020-10-05 NOTE — TELEPHONE ENCOUNTER
Spoke with Mona at [facility] informed her that I received the CMN form on Mrs. Hirsch and I will have Dr. Chaney sign and fax tomorrow when he comes back to the office guru

## 2020-10-05 NOTE — TELEPHONE ENCOUNTER
Received fax from [facility] filling out form and will fax tomorrow after signed by MD
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
## 2023-01-02 PROBLEM — J18.9 PNEUMONIA DUE TO INFECTIOUS ORGANISM: Status: ACTIVE | Noted: 2023-01-02

## 2023-01-02 PROBLEM — J44.1 COPD EXACERBATION: Status: ACTIVE | Noted: 2023-01-02

## 2023-01-02 PROBLEM — N39.0 URINARY TRACT INFECTION WITHOUT HEMATURIA: Status: ACTIVE | Noted: 2023-01-02

## 2023-01-03 PROBLEM — J96.21 ACUTE ON CHRONIC RESPIRATORY FAILURE WITH HYPOXIA AND HYPERCAPNIA: Status: ACTIVE | Noted: 2023-01-03

## 2023-01-03 PROBLEM — J96.22 ACUTE ON CHRONIC RESPIRATORY FAILURE WITH HYPOXIA AND HYPERCAPNIA: Status: ACTIVE | Noted: 2023-01-03

## 2023-01-30 ENCOUNTER — EXTERNAL HOME HEALTH (OUTPATIENT)
Dept: HOME HEALTH SERVICES | Facility: HOSPITAL | Age: 83
End: 2023-01-30
Payer: MEDICARE

## 2023-01-30 PROBLEM — R79.89 ELEVATED TROPONIN: Status: ACTIVE | Noted: 2023-01-30

## 2023-01-30 PROBLEM — I48.91 ATRIAL FIBRILLATION WITH RAPID VENTRICULAR RESPONSE: Status: ACTIVE | Noted: 2023-01-30

## 2023-01-30 PROBLEM — R93.3 ABNORMAL CT SCAN, STOMACH: Status: ACTIVE | Noted: 2023-01-30

## 2023-02-03 ENCOUNTER — DOCUMENT SCAN (OUTPATIENT)
Dept: HOME HEALTH SERVICES | Facility: HOSPITAL | Age: 83
End: 2023-02-03
Payer: MEDICARE

## 2023-02-04 PROBLEM — I48.19 PERSISTENT ATRIAL FIBRILLATION: Status: ACTIVE | Noted: 2023-02-04

## 2023-02-06 ENCOUNTER — TELEPHONE (OUTPATIENT)
Dept: PRIMARY CARE CLINIC | Facility: CLINIC | Age: 83
End: 2023-02-06
Payer: MEDICARE

## 2023-02-06 PROBLEM — I50.31 ACUTE DIASTOLIC HEART FAILURE: Status: ACTIVE | Noted: 2023-02-06

## 2023-02-06 PROBLEM — I48.91 ATRIAL FIBRILLATION WITH RAPID VENTRICULAR RESPONSE: Status: RESOLVED | Noted: 2023-01-30 | Resolved: 2023-02-06

## 2023-02-06 PROBLEM — I25.10 CORONARY ARTERY DISEASE WITHOUT ANGINA PECTORIS: Status: ACTIVE | Noted: 2023-02-06

## 2023-02-07 ENCOUNTER — NURSE TRIAGE (OUTPATIENT)
Dept: ADMINISTRATIVE | Facility: CLINIC | Age: 83
End: 2023-02-07
Payer: MEDICARE

## 2023-02-07 ENCOUNTER — PATIENT OUTREACH (OUTPATIENT)
Dept: ADMINISTRATIVE | Facility: CLINIC | Age: 83
End: 2023-02-07
Payer: MEDICARE

## 2023-02-07 DIAGNOSIS — Z00.00 ENCOUNTER FOR MEDICARE ANNUAL WELLNESS EXAM: ICD-10-CM

## 2023-02-07 NOTE — TELEPHONE ENCOUNTER
Patient's daughter states patient was discharged from the hospital on yesterday, 02/06/23, s/p Cardioversion and currently c/o severe difficulty breathing and hypoxemia (85%) during any ambulation or movement. Daughter states patient uses Home O2 with a reading of 91% at rest.    Care Advice given to Go to ED Now for evaluation/treatment. Daughter also advised to Call EMS/911 for any episode of difficulty breathing and hypoxemia by patient during ambulation for transport. Patient's daughter states understanding of care advice.    Reason for Disposition   Oxygen level (e.g., pulse oximetry) 90 percent or lower    Additional Information   Negative: SEVERE difficulty breathing (e.g., struggling for each breath, speaks in single words, pulse > 120)   Negative: Breathing stopped and hasn't returned   Negative: Choking on something   Negative: Bluish (or gray) lips or face   Negative: Difficult to awaken or acting confused (e.g., disoriented, slurred speech)   Negative: Passed out (i.e., fainted, collapsed and was not responding)   Negative: Wheezing started suddenly after medicine, an allergic food, or bee sting   Negative: Stridor   Negative: Slow, shallow and weak breathing   Negative: Sounds like a life-threatening emergency to the triager   Negative: Chest pain   Negative: Wheezing (high pitched whistling sound) and previous asthma attacks or use of asthma medicines   Negative: Difficulty breathing and within 14 days of COVID-19 Exposure   Negative: Difficulty breathing and only present when coughing   Negative: Difficulty breathing and only from stuffy nose   Negative: Difficulty breathing and only from stuffy nose or runny nose from common cold   Negative: MODERATE difficulty breathing (e.g., speaks in phrases, SOB even at rest, pulse 100-120) of new-onset or worse than normal    Protocols used: Breathing Difficulty-A-OH

## 2023-02-07 NOTE — PROGRESS NOTES
"See charting and "my note". Patient's daughter, Nadya, agreed to transfer to triage nurse d/t patient stated concerns.  "

## 2023-02-07 NOTE — TELEPHONE ENCOUNTER
02/07/23 @ 1203 request for triage assistance d/t patient complaints (see note). Successfully called report to triage nurse and transferred at 1207.

## 2023-02-08 DIAGNOSIS — Z78.0 MENOPAUSE: ICD-10-CM

## 2023-02-08 NOTE — PROGRESS NOTES
2nd Attempt made to reach patient for TCC call. Left voicemail please call [phone] leave first name, last name, and  Karma will return your call.

## 2023-02-09 DIAGNOSIS — Z00.00 ENCOUNTER FOR MEDICARE ANNUAL WELLNESS EXAM: ICD-10-CM

## 2023-03-03 ENCOUNTER — DOCUMENT SCAN (OUTPATIENT)
Dept: HOME HEALTH SERVICES | Facility: HOSPITAL | Age: 83
End: 2023-03-03
Payer: MEDICARE

## 2023-03-14 ENCOUNTER — DOCUMENT SCAN (OUTPATIENT)
Dept: HOME HEALTH SERVICES | Facility: HOSPITAL | Age: 83
End: 2023-03-14
Payer: MEDICARE

## 2023-03-14 ENCOUNTER — TELEPHONE (OUTPATIENT)
Dept: CARDIOLOGY | Facility: CLINIC | Age: 83
End: 2023-03-14
Payer: MEDICARE

## 2023-03-14 NOTE — TELEPHONE ENCOUNTER
Spoke with patient, Dr. Gutierrez performed INOCENCIA in Feb 2023.  She states she still does not feel right and would like to schedule a new patient appointment.  Scheduled next available appointment per patient request.

## 2023-03-14 NOTE — TELEPHONE ENCOUNTER
----- Message from Farzad Villatoro sent at 3/14/2023  3:34 PM CDT -----  Regarding: call back  Pt call to schedule hospital follow up    Call

## 2023-03-21 ENCOUNTER — OFFICE VISIT (OUTPATIENT)
Dept: CARDIOLOGY | Facility: CLINIC | Age: 83
End: 2023-03-21
Payer: MEDICARE

## 2023-03-21 VITALS
SYSTOLIC BLOOD PRESSURE: 139 MMHG | WEIGHT: 113 LBS | DIASTOLIC BLOOD PRESSURE: 63 MMHG | HEART RATE: 73 BPM | BODY MASS INDEX: 20.02 KG/M2 | HEIGHT: 63 IN

## 2023-03-21 DIAGNOSIS — I73.9 PERIPHERAL ARTERY DISEASE: ICD-10-CM

## 2023-03-21 DIAGNOSIS — I10 HYPERTENSION, UNSPECIFIED TYPE: ICD-10-CM

## 2023-03-21 DIAGNOSIS — I50.32 CHRONIC HEART FAILURE WITH PRESERVED EJECTION FRACTION: ICD-10-CM

## 2023-03-21 DIAGNOSIS — I48.19 PERSISTENT ATRIAL FIBRILLATION: ICD-10-CM

## 2023-03-21 DIAGNOSIS — I25.10 ATHEROSCLEROSIS OF NATIVE CORONARY ARTERY OF NATIVE HEART WITHOUT ANGINA PECTORIS: Primary | ICD-10-CM

## 2023-03-21 DIAGNOSIS — I65.29 STENOSIS OF CAROTID ARTERY, UNSPECIFIED LATERALITY: ICD-10-CM

## 2023-03-21 DIAGNOSIS — Z95.1 S/P CABG (CORONARY ARTERY BYPASS GRAFT): ICD-10-CM

## 2023-03-21 DIAGNOSIS — E78.5 HYPERLIPIDEMIA, UNSPECIFIED HYPERLIPIDEMIA TYPE: ICD-10-CM

## 2023-03-21 PROBLEM — I50.30 (HFPEF) HEART FAILURE WITH PRESERVED EJECTION FRACTION: Status: ACTIVE | Noted: 2023-03-21

## 2023-03-21 PROCEDURE — 3078F PR MOST RECENT DIASTOLIC BLOOD PRESSURE < 80 MM HG: ICD-10-PCS | Mod: HCNC,CPTII,S$GLB, | Performed by: INTERNAL MEDICINE

## 2023-03-21 PROCEDURE — 3288F PR FALLS RISK ASSESSMENT DOCUMENTED: ICD-10-PCS | Mod: HCNC,CPTII,S$GLB, | Performed by: INTERNAL MEDICINE

## 2023-03-21 PROCEDURE — 99999 PR PBB SHADOW E&M-EST. PATIENT-LVL IV: CPT | Mod: PBBFAC,HCNC,, | Performed by: INTERNAL MEDICINE

## 2023-03-21 PROCEDURE — 3078F DIAST BP <80 MM HG: CPT | Mod: HCNC,CPTII,S$GLB, | Performed by: INTERNAL MEDICINE

## 2023-03-21 PROCEDURE — 3075F SYST BP GE 130 - 139MM HG: CPT | Mod: HCNC,CPTII,S$GLB, | Performed by: INTERNAL MEDICINE

## 2023-03-21 PROCEDURE — 99215 PR OFFICE/OUTPT VISIT, EST, LEVL V, 40-54 MIN: ICD-10-PCS | Mod: HCNC,S$GLB,, | Performed by: INTERNAL MEDICINE

## 2023-03-21 PROCEDURE — 93000 ELECTROCARDIOGRAM COMPLETE: CPT | Mod: HCNC,S$GLB,, | Performed by: INTERNAL MEDICINE

## 2023-03-21 PROCEDURE — 93000 EKG 12-LEAD: ICD-10-PCS | Mod: HCNC,S$GLB,, | Performed by: INTERNAL MEDICINE

## 2023-03-21 PROCEDURE — 99999 PR PBB SHADOW E&M-EST. PATIENT-LVL IV: ICD-10-PCS | Mod: PBBFAC,HCNC,, | Performed by: INTERNAL MEDICINE

## 2023-03-21 PROCEDURE — 1159F PR MEDICATION LIST DOCUMENTED IN MEDICAL RECORD: ICD-10-PCS | Mod: HCNC,CPTII,S$GLB, | Performed by: INTERNAL MEDICINE

## 2023-03-21 PROCEDURE — 1160F PR REVIEW ALL MEDS BY PRESCRIBER/CLIN PHARMACIST DOCUMENTED: ICD-10-PCS | Mod: HCNC,CPTII,S$GLB, | Performed by: INTERNAL MEDICINE

## 2023-03-21 PROCEDURE — 1126F AMNT PAIN NOTED NONE PRSNT: CPT | Mod: HCNC,CPTII,S$GLB, | Performed by: INTERNAL MEDICINE

## 2023-03-21 PROCEDURE — 1101F PR PT FALLS ASSESS DOC 0-1 FALLS W/OUT INJ PAST YR: ICD-10-PCS | Mod: HCNC,CPTII,S$GLB, | Performed by: INTERNAL MEDICINE

## 2023-03-21 PROCEDURE — 3288F FALL RISK ASSESSMENT DOCD: CPT | Mod: HCNC,CPTII,S$GLB, | Performed by: INTERNAL MEDICINE

## 2023-03-21 PROCEDURE — 1159F MED LIST DOCD IN RCRD: CPT | Mod: HCNC,CPTII,S$GLB, | Performed by: INTERNAL MEDICINE

## 2023-03-21 PROCEDURE — 1160F RVW MEDS BY RX/DR IN RCRD: CPT | Mod: HCNC,CPTII,S$GLB, | Performed by: INTERNAL MEDICINE

## 2023-03-21 PROCEDURE — 1101F PT FALLS ASSESS-DOCD LE1/YR: CPT | Mod: HCNC,CPTII,S$GLB, | Performed by: INTERNAL MEDICINE

## 2023-03-21 PROCEDURE — 1126F PR PAIN SEVERITY QUANTIFIED, NO PAIN PRESENT: ICD-10-PCS | Mod: HCNC,CPTII,S$GLB, | Performed by: INTERNAL MEDICINE

## 2023-03-21 PROCEDURE — 3075F PR MOST RECENT SYSTOLIC BLOOD PRESS GE 130-139MM HG: ICD-10-PCS | Mod: HCNC,CPTII,S$GLB, | Performed by: INTERNAL MEDICINE

## 2023-03-21 PROCEDURE — 99215 OFFICE O/P EST HI 40 MIN: CPT | Mod: HCNC,S$GLB,, | Performed by: INTERNAL MEDICINE

## 2023-03-21 NOTE — PROGRESS NOTES
[facility] [address]  Cardiology Clinic Note      Chief Complaint  Chief Complaint   Patient presents with    Follow-up     INOCENCIA 01/31/2023   Dizzy, hard to breath       HPI:    83-year-old female with past medical history of hiatal hernia, COPD with chronic respiratory failure on home oxygen, hypertension, hyperlipidemia, carotid stenosis, CAD status post CABG no operative note [place] 2015, peripheral artery disease with occlusion of the right common iliac artery by CT 2023, HFpEF INOCENCIA 02/01/2023 normal EF normal RV while left atrial enlargement mild mitral regurgitation moderate aortic regurgitation mild tricuspid regurgitation no left atrial appendage thrombus aortic plaque visualized prior echo 01/03/2023 normal LVEF mild LVH grade 2 diastolic dysfunction moderate aortic valve regurgitation mild mitral valve regurgitation normal RV mild to moderate tricuspid valve regurgitation PASP 58 CVP 8     Recently hospitalized with decompensated heart failure and atrial fibrillation with RVR status post INOCENCIA cardioversion which was unsuccessful as an inpatient thus amiodarone was discontinued given chronic lung disease course complicated by demand ischemia.  Held the aspirin as patient was on Eliquis.    Remains with chronic dyspnea on exertion on home oxygen due to chronic lung disease.    Patient is not sure if she is taking losartan.    Discussed importance of follow up with her primary care doctor Dr. Chaney.    Medications  Current Outpatient Medications   Medication Sig Dispense Refill    apixaban (ELIQUIS) 2.5 mg Tab Take 1 tablet (2.5 mg total) by mouth 2 (two) times daily. 60 tablet 2    diltiaZEM (CARDIZEM CD) 120 MG Cp24 Take 1 capsule (120 mg total) by mouth once daily. 90 capsule 3    ipratropium (ATROVENT) 0.02 % nebulizer solution Take 2.5 mLs (500 mcg total) by nebulization every 8 (eight) hours as needed for Wheezing. 75 mL 3    levalbuterol (XOPENEX) 1.25 mg/3 mL nebulizer solution Take 3 mLs  (1.25 mg total) by nebulization every 8 (eight) hours as needed for Wheezing or Shortness of Breath. 75 each 2    metoprolol succinate (TOPROL-XL) 50 MG 24 hr tablet Take 1 tablet (50 mg total) by mouth once daily. 30 tablet 2    nitroGLYCERIN (NITROSTAT) 0.4 MG SL tablet PLACE 1 TAB UNDER TONGUE FOR CHEST PAIN EVERY 5 MINUTES UP TO 3 DOSES GO TO ER IF PAIN NOT RELIEVED  3    omeprazole (PRILOSEC) 20 MG capsule Take 20 mg by mouth once daily.  3    albuterol (PROVENTIL/VENTOLIN HFA) 90 mcg/actuation inhaler Inhale 1 puff into the lungs once daily. Rescue (Patient not taking: Reported on 3/21/2023) 18 g 6    losartan (COZAAR) 25 MG tablet TAKE ONE TABLET BY MOUTH ONCE DAILY (Patient not taking: Reported on 3/21/2023) 90 tablet 3    ranitidine (ZANTAC) 150 MG capsule Take 150 mg by mouth 2 (two) times daily.      rosuvastatin (CRESTOR) 10 MG tablet       tiotropium-olodaterol (STIOLTO RESPIMAT) 2.5-2.5 mcg/actuation Mist Inhale 2 puffs into the lungs once daily. Controller (Patient not taking: Reported on 3/21/2023) 4 g 6     No current facility-administered medications for this visit.        History  Past Medical History:   Diagnosis Date    COPD (chronic obstructive pulmonary disease)     Hyperlipidemia     Hypertension      Past Surgical History:   Procedure Laterality Date    ESOPHAGOGASTRODUODENOSCOPY N/A 2/1/2023    Procedure: EGD (ESOPHAGOGASTRODUODENOSCOPY);  Surgeon: Gonsalo Esquivel MD;  Location: [facility] ENDO;  Service: Endoscopy;  Laterality: N/A;    gall stone      HEMORRHOID SURGERY      open heart surgery      TRANSESOPHAGEAL ECHOCARDIOGRAM WITH POSSIBLE CARDIOVERSION (INOCENCIA W/ POSS CARDIOVERSION) N/A 2/1/2023    Procedure: Transesophageal echo (INOCENCIA) intra-procedure log documentation;  Surgeon: Pawel Gutierrez MD;  Location: [facility] CATH LAB;  Service: Cardiology;  Laterality: N/A;    TREATMENT OF CARDIAC ARRHYTHMIA N/A 2/1/2023    Procedure: Cardioversion or Defibrillation;  Surgeon: Pawel Gutierrez MD;   [facility];  Service: Cardiology;  Laterality: N/A;     Social History     Socioeconomic History    Marital status:    Tobacco Use    Smoking status: Former     Types: Cigarettes     Quit date: 6/3/2012     Years since quitting: 10.8    Smokeless tobacco: Never   Substance and Sexual Activity    Alcohol use: No     Social Determinants of Health     Financial Resource Strain: Low Risk     Difficulty of Paying Living Expenses: Not hard at all   Food Insecurity: No Food Insecurity    Worried About Running Out of Food in the Last Year: Never true    Ran Out of Food in the Last Year: Never true   Transportation Needs: No Transportation Needs    Lack of Transportation (Medical): No    Lack of Transportation (Non-Medical): No   Stress: No Stress Concern Present    Feeling of Stress : Not at all   Social Connections: Moderately Isolated    Frequency of Communication with Friends and Family: More than three times a week    Frequency of Social Gatherings with Friends and Family: More than three times a week    Attends Synagogue Services: Never    Active Member of Clubs or Organizations: Yes    Attends Club or Organization Meetings: Never    Marital Status:    Housing Stability: Low Risk     Unable to Pay for Housing in the Last Year: No    Number of Places Lived in the Last Year: 1    Unstable Housing in the Last Year: No     Family History   Family history unknown: Yes        Allergies  Review of patient's allergies indicates:  No Known Allergies    Review of Systems   Review of Systems   Constitutional: Negative for fever.   HENT:  Negative for nosebleeds.    Eyes:  Negative for visual disturbance.   Cardiovascular:  Positive for dyspnea on exertion. Negative for chest pain, claudication, palpitations and syncope.   Respiratory:  Negative for cough, hemoptysis and wheezing.    Endocrine: Negative for cold intolerance, heat intolerance, polyphagia and polyuria.   Hematologic/Lymphatic: Negative for  bleeding problem.   Skin:  Negative for rash.   Musculoskeletal:  Negative for myalgias.   Gastrointestinal:  Negative for hematemesis, hematochezia, nausea and vomiting.   Genitourinary:  Negative for dysuria.   Neurological:  Negative for focal weakness and sensory change.   Psychiatric/Behavioral:  Negative for altered mental status.      Physical Exam  Vitals:    03/21/23 1329   BP: 139/63   Pulse: 73     Wt Readings from Last 1 Encounters:   03/21/23 51.3 kg (113 lb)     Physical Exam  Constitutional:       General: She is not in acute distress.  HENT:      Head: Normocephalic and atraumatic.      Mouth/Throat:      Mouth: Mucous membranes are moist.   Eyes:      Extraocular Movements: Extraocular movements intact.      Pupils: Pupils are equal, round, and reactive to light.   Neck:      Vascular: No carotid bruit or JVD.   Cardiovascular:      Rate and Rhythm: Normal rate and regular rhythm.      Heart sounds: No murmur heard.    No friction rub. No gallop.   Pulmonary:      Effort: Pulmonary effort is normal.      Breath sounds: Normal breath sounds.   Abdominal:      Tenderness: There is no abdominal tenderness. There is no guarding or rebound.   Musculoskeletal:      Right lower leg: No edema.      Left lower leg: No edema.   Skin:     General: Skin is warm and dry.      Capillary Refill: Capillary refill takes less than 2 seconds.   Neurological:      General: No focal deficit present.      Mental Status: She is alert.   Psychiatric:         Mood and Affect: Mood normal.       Labs  No results displayed because visit has over 200 results.      No results displayed because visit has over 200 results.          EKG  03/21/2023 Normal sinus rhythm normal rate borderline abnormal precordial R-wave progression nonspecific ST-T changes    Echo   Results for orders placed or performed during the hospital encounter of 01/01/23   Echo   Result Value Ref Range    BSA 1.59 m2    TDI SEPTAL 0.05 m/s    LV LATERAL E/E'  RATIO 15.83 m/s    LV SEPTAL E/E' RATIO 19.00 m/s    IVC diameter 1.82 cm    Left Ventricular Outflow Tract Mean Velocity 0.52 cm/s    Left Ventricular Outflow Tract Mean Gradient 1.22 mmHg    AORTIC VALVE CUSP SEPERATION 0.96 cm    TDI LATERAL 0.06 m/s    PV PEAK VELOCITY 0.81 cm/s    LVIDd 4.17 3.5 - 6.0 cm    IVS 0.79 0.6 - 1.1 cm    Posterior Wall 1.09 0.6 - 1.1 cm    Ao root annulus 2.46 cm    LVIDs 3.10 2.1 - 4.0 cm    FS 26 28 - 44 %    Sinus 2.67 cm    STJ 2.30 cm    Ascending aorta 2.71 cm    LV mass 124.51 g    LA size 3.89 cm    RVDD 2.05 cm    Left Ventricle Relative Wall Thickness 0.52 cm    AV regurgitation pressure 1/2 time [id number] ms    AV mean gradient 7 mmHg    AV valve area 1.42 cm2    AV Velocity Ratio 0.42     AV index (prosthetic) 0.46     MV valve area p 1/2 method 4.04 cm2    E/A ratio 1.79     Mean e' 0.06 m/s    E wave deceleration time 147.56 msec    LVOT diameter 1.99 cm    LVOT area 3.1 cm2    LVOT peak olegario 0.77 m/s    LVOT peak VTI 18.70 cm    Ao peak olegario 1.82 m/s    Ao VTI 41.0 cm    Mr max olegario 4.98 m/s    LVOT stroke volume 58.13 cm3    AV peak gradient 13 mmHg    E/E' ratio 17.27 m/s    MV Peak E Olegario 0.95 m/s    AR Max Olegario 3.89 m/s    TR Max Olegario 3.54 m/s    MV stenosis pressure 1/2 time 54.43 ms    MV Peak A Olegario 0.53 m/s    LV Systolic Volume 38.04 mL    LV Systolic Volume Index 23.9 mL/m2    LV Diastolic Volume 77.17 mL    LV Diastolic Volume Index 48.53 mL/m2    LV Mass Index 78 g/m2    RA Major Axis 3.96 cm    Left Atrium Minor Axis 3.97 cm    Left Atrium Major Axis 5.41 cm    Triscuspid Valve Regurgitation Peak Gradient 50 mmHg    LA Volume Index (Mod) 37.2 mL/m2    LA volume (mod) 59.19 cm3    RA Width 2.82 cm    Right Atrial Pressure (from IVC) 8 mmHg    EF 55 %    TV rest pulmonary artery pressure 58 mmHg    Narrative    · The left ventricle is normal in size with mild concentric hypertrophy   and normal systolic function.  · The estimated ejection fraction is  55%.  · Grade II left ventricular diastolic dysfunction.  · Mild left atrial enlargement.  · Moderate aortic regurgitation.  · Mild mitral regurgitation.  · Normal right ventricular size with normal right ventricular systolic   function.  · Mild to moderate tricuspid regurgitation.  · There is pulmonary hypertension.  · The estimated PA systolic pressure is 58 mmHg.  · Intermediate central venous pressure (8 mmHg).          Imaging  No results found.    Prior coronary angiogram / intervention:  See HPI    Assessment and Plan  1. Atherosclerosis of native coronary artery of native heart without angina pectoris  Continue anticoagulation with adjusted dose Eliquis, statin, beta-blocker  Okay to hold aspirin while on anticoagulation    2. S/P CABG (coronary artery bypass graft)  As above    3. Persistent atrial fibrillation  Continue adjusted dose Eliquis in addition to diltiazem and metoprolol  Normal sinus rhythm today EKG interpretation as above  - Ambulatory referral/consult to Cardiac Electrophysiology; Future  - IN OFFICE EKG 12-LEAD (to [facility])    4. Chronic heart failure with preserved ejection fraction  Euvolemic off diuretics    5. Hypertension, unspecified type  Calcium channel blocker, beta-blocker, losartan  However, patient is not sure if she is taking losartan and is slightly hypertensive today  Patient's daughter is a nurse and will keep a blood pressure log and inform us whether or not the patient is taking losartan    6. Hyperlipidemia, unspecified hyperlipidemia type  Continue statin could consider Crestor 20 as opposed to 10    7. Stenosis of carotid artery, unspecified laterality  Need records  Consider ultrasound next visit if unable to find     8. Peripheral artery disease  Continue anticoagulation with statin    Follow Up  Follow up in about 3 months (around 6/21/2023).  Advised patient to follow up with PCP soon for repeat labs and to follow up with pulmonology for her advanced lung  disease  Consider carotid ultrasound next visit    Pawel Gutierrez MD, FACC, RPVI  Interventional Cardiology

## 2023-04-10 PROBLEM — J18.9 PNEUMONIA DUE TO INFECTIOUS ORGANISM: Status: RESOLVED | Noted: 2023-01-02 | Resolved: 2023-04-10

## 2023-04-10 PROBLEM — J96.21 ACUTE ON CHRONIC RESPIRATORY FAILURE WITH HYPOXIA AND HYPERCAPNIA: Status: RESOLVED | Noted: 2023-01-03 | Resolved: 2023-04-10

## 2023-04-10 PROBLEM — N39.0 URINARY TRACT INFECTION WITHOUT HEMATURIA: Status: RESOLVED | Noted: 2023-01-02 | Resolved: 2023-04-10

## 2023-04-10 PROBLEM — J96.22 ACUTE ON CHRONIC RESPIRATORY FAILURE WITH HYPOXIA AND HYPERCAPNIA: Status: RESOLVED | Noted: 2023-01-03 | Resolved: 2023-04-10

## 2023-04-14 ENCOUNTER — TELEPHONE (OUTPATIENT)
Dept: ELECTROPHYSIOLOGY | Facility: CLINIC | Age: 83
End: 2023-04-14
Payer: MEDICARE

## 2023-04-14 NOTE — TELEPHONE ENCOUNTER
Called pt s/w pt , she stated that she is to sick right now to leave [facility] and doesn't want to schedule appt right now

## 2023-04-26 ENCOUNTER — OFFICE VISIT (OUTPATIENT)
Dept: PRIMARY CARE CLINIC | Facility: CLINIC | Age: 83
End: 2023-04-26
Payer: MEDICARE

## 2023-04-26 VITALS
SYSTOLIC BLOOD PRESSURE: 112 MMHG | OXYGEN SATURATION: 91 % | HEIGHT: 63 IN | WEIGHT: 113.31 LBS | TEMPERATURE: 97 F | HEART RATE: 67 BPM | BODY MASS INDEX: 20.08 KG/M2 | RESPIRATION RATE: 16 BRPM | DIASTOLIC BLOOD PRESSURE: 36 MMHG

## 2023-04-26 DIAGNOSIS — I50.31 ACUTE DIASTOLIC HEART FAILURE: ICD-10-CM

## 2023-04-26 DIAGNOSIS — I25.10 ATHEROSCLEROSIS OF NATIVE CORONARY ARTERY OF NATIVE HEART WITHOUT ANGINA PECTORIS: ICD-10-CM

## 2023-04-26 DIAGNOSIS — Z95.1 S/P CABG (CORONARY ARTERY BYPASS GRAFT): ICD-10-CM

## 2023-04-26 DIAGNOSIS — J96.11 CHRONIC RESPIRATORY FAILURE WITH HYPOXIA: Primary | ICD-10-CM

## 2023-04-26 DIAGNOSIS — R73.09 OTHER ABNORMAL GLUCOSE: ICD-10-CM

## 2023-04-26 DIAGNOSIS — R21 RASH IN ADULT: ICD-10-CM

## 2023-04-26 DIAGNOSIS — K21.9 GASTROESOPHAGEAL REFLUX DISEASE, UNSPECIFIED WHETHER ESOPHAGITIS PRESENT: ICD-10-CM

## 2023-04-26 DIAGNOSIS — I50.32 CHRONIC HEART FAILURE WITH PRESERVED EJECTION FRACTION: ICD-10-CM

## 2023-04-26 PROCEDURE — 99214 OFFICE O/P EST MOD 30 MIN: CPT | Mod: HCNC,S$GLB,, | Performed by: STUDENT IN AN ORGANIZED HEALTH CARE EDUCATION/TRAINING PROGRAM

## 2023-04-26 PROCEDURE — 1101F PT FALLS ASSESS-DOCD LE1/YR: CPT | Mod: HCNC,CPTII,S$GLB, | Performed by: STUDENT IN AN ORGANIZED HEALTH CARE EDUCATION/TRAINING PROGRAM

## 2023-04-26 PROCEDURE — 99999 PR PBB SHADOW E&M-EST. PATIENT-LVL IV: CPT | Mod: PBBFAC,HCNC,, | Performed by: STUDENT IN AN ORGANIZED HEALTH CARE EDUCATION/TRAINING PROGRAM

## 2023-04-26 PROCEDURE — 3074F SYST BP LT 130 MM HG: CPT | Mod: HCNC,CPTII,S$GLB, | Performed by: STUDENT IN AN ORGANIZED HEALTH CARE EDUCATION/TRAINING PROGRAM

## 2023-04-26 PROCEDURE — 1101F PR PT FALLS ASSESS DOC 0-1 FALLS W/OUT INJ PAST YR: ICD-10-PCS | Mod: HCNC,CPTII,S$GLB, | Performed by: STUDENT IN AN ORGANIZED HEALTH CARE EDUCATION/TRAINING PROGRAM

## 2023-04-26 PROCEDURE — 99999 PR PBB SHADOW E&M-EST. PATIENT-LVL IV: ICD-10-PCS | Mod: PBBFAC,HCNC,, | Performed by: STUDENT IN AN ORGANIZED HEALTH CARE EDUCATION/TRAINING PROGRAM

## 2023-04-26 PROCEDURE — 1159F PR MEDICATION LIST DOCUMENTED IN MEDICAL RECORD: ICD-10-PCS | Mod: HCNC,CPTII,S$GLB, | Performed by: STUDENT IN AN ORGANIZED HEALTH CARE EDUCATION/TRAINING PROGRAM

## 2023-04-26 PROCEDURE — 1159F MED LIST DOCD IN RCRD: CPT | Mod: HCNC,CPTII,S$GLB, | Performed by: STUDENT IN AN ORGANIZED HEALTH CARE EDUCATION/TRAINING PROGRAM

## 2023-04-26 PROCEDURE — 1126F AMNT PAIN NOTED NONE PRSNT: CPT | Mod: HCNC,CPTII,S$GLB, | Performed by: STUDENT IN AN ORGANIZED HEALTH CARE EDUCATION/TRAINING PROGRAM

## 2023-04-26 PROCEDURE — 3078F DIAST BP <80 MM HG: CPT | Mod: HCNC,CPTII,S$GLB, | Performed by: STUDENT IN AN ORGANIZED HEALTH CARE EDUCATION/TRAINING PROGRAM

## 2023-04-26 PROCEDURE — 1126F PR PAIN SEVERITY QUANTIFIED, NO PAIN PRESENT: ICD-10-PCS | Mod: HCNC,CPTII,S$GLB, | Performed by: STUDENT IN AN ORGANIZED HEALTH CARE EDUCATION/TRAINING PROGRAM

## 2023-04-26 PROCEDURE — 3288F FALL RISK ASSESSMENT DOCD: CPT | Mod: HCNC,CPTII,S$GLB, | Performed by: STUDENT IN AN ORGANIZED HEALTH CARE EDUCATION/TRAINING PROGRAM

## 2023-04-26 PROCEDURE — 99214 PR OFFICE/OUTPT VISIT, EST, LEVL IV, 30-39 MIN: ICD-10-PCS | Mod: HCNC,S$GLB,, | Performed by: STUDENT IN AN ORGANIZED HEALTH CARE EDUCATION/TRAINING PROGRAM

## 2023-04-26 PROCEDURE — 1160F PR REVIEW ALL MEDS BY PRESCRIBER/CLIN PHARMACIST DOCUMENTED: ICD-10-PCS | Mod: HCNC,CPTII,S$GLB, | Performed by: STUDENT IN AN ORGANIZED HEALTH CARE EDUCATION/TRAINING PROGRAM

## 2023-04-26 PROCEDURE — 1160F RVW MEDS BY RX/DR IN RCRD: CPT | Mod: HCNC,CPTII,S$GLB, | Performed by: STUDENT IN AN ORGANIZED HEALTH CARE EDUCATION/TRAINING PROGRAM

## 2023-04-26 PROCEDURE — 3078F PR MOST RECENT DIASTOLIC BLOOD PRESSURE < 80 MM HG: ICD-10-PCS | Mod: HCNC,CPTII,S$GLB, | Performed by: STUDENT IN AN ORGANIZED HEALTH CARE EDUCATION/TRAINING PROGRAM

## 2023-04-26 PROCEDURE — 3074F PR MOST RECENT SYSTOLIC BLOOD PRESSURE < 130 MM HG: ICD-10-PCS | Mod: HCNC,CPTII,S$GLB, | Performed by: STUDENT IN AN ORGANIZED HEALTH CARE EDUCATION/TRAINING PROGRAM

## 2023-04-26 PROCEDURE — 3288F PR FALLS RISK ASSESSMENT DOCUMENTED: ICD-10-PCS | Mod: HCNC,CPTII,S$GLB, | Performed by: STUDENT IN AN ORGANIZED HEALTH CARE EDUCATION/TRAINING PROGRAM

## 2023-04-26 RX ORDER — GLYCOPYRROLATE AND FORMOTEROL FUMARATE 9; 4.8 UG/1; UG/1
2 AEROSOL, METERED RESPIRATORY (INHALATION) 2 TIMES DAILY
Qty: 10.7 G | Refills: 5 | Status: SHIPPED | OUTPATIENT
Start: 2023-04-26 | End: 2023-08-18

## 2023-04-26 RX ORDER — OMEPRAZOLE 20 MG/1
20 CAPSULE, DELAYED RELEASE ORAL DAILY
Qty: 90 CAPSULE | Refills: 3 | Status: SHIPPED | OUTPATIENT
Start: 2023-04-26 | End: 2024-01-19

## 2023-04-26 NOTE — PATIENT INSTRUCTIONS
Est Care:   - new patient presents with her daughter.    - hx of heart disease, Afib, COPD, GERD, HTN, HLD.     COPD:   - starting patient on new inhailer to use 2 puffs twice daily.   - using O2 at 2L continuous.   - mild nose bleeds infrequently. Can use AYR nasal saline.   - PRN nebulizer use.    GERD:   - refilling PPI for daily use at this time.    HTN:    - BP controlled, on low side.     - do not restart Losartan at this time.   - keep watch of BP at home.  Continue with Metoprolol and Diltiazem.    Right Arm Rash:   - 2 weeks of right arm.   - likely Shingles.  Had pain/itching, right arm w/ rash then blisters that ruptured.  Now scabbing.   - will continue to monitor, expect to improve with time.

## 2023-04-26 NOTE — PROGRESS NOTES
Subjective:           Patient ID: Ann Marie Hirsch is a 83 y.o. female who presents today with a chief complaint of est care, med refill, rash to right arm.    Chief Complaint:   Establish Care, Medication Refill, and Rash (Right arm)      History of Present Illness:    83-year-old female with past medical history of hiatal hernia, COPD with chronic respiratory failure on home oxygen, hypertension, hyperlipidemia, carotid stenosis, CAD status post CABG    Hx of atherosclerosis, s/p CABG, Afib: per cardiology note on 3/21/23 states patient taking Elequid, Diltiazem, metoprolol.  Possible Losartan though patient was not sure if taking at that time.     States that she has been off the losartan for a few years.  BP on 104/30 on rooming today.     Right arm rash:  States her arm started hurting and then started to develop a rash to the arm.  Has been there for about 2 weeks.    Had tendernesson the skin.       Has been having decrease appetite, nothing tastes good.    Review of Systems   Constitutional:  Positive for fatigue and fever. Negative for activity change and unexpected weight change.   HENT:  Negative for congestion, nosebleeds, sinus pressure and sneezing.    Respiratory:  Positive for cough and shortness of breath. Negative for wheezing.    Cardiovascular:  Negative for chest pain, palpitations and leg swelling.   Gastrointestinal:  Negative for abdominal distention, constipation, diarrhea and nausea.   Genitourinary:  Negative for difficulty urinating and dysuria.   Musculoskeletal:  Negative for back pain and gait problem.   Skin:  Positive for rash. Negative for pallor.   Neurological:  Negative for weakness, numbness and headaches.   Psychiatric/Behavioral:  Negative for agitation. The patient is not nervous/anxious.          Objective:        Vitals:    04/26/23 1446   BP: (!) 104/30   BP Location: Left arm   Patient Position: Sitting   BP Method: Medium (Manual)   Pulse: 67   Resp: 16   Temp: 97.3 °F  "(36.3 °C)   TempSrc: Temporal   SpO2: (!) 91%   Weight: 51.4 kg (113 lb 5.1 oz)   Height: 5' 3" (1.6 m)       Body mass index is 20.07 kg/m².      Physical Exam  Constitutional:       General: She is not in acute distress.     Appearance: Normal appearance. She is ill-appearing.   HENT:      Head: Normocephalic and atraumatic.      Right Ear: External ear normal.      Left Ear: External ear normal.      Nose: No rhinorrhea.      Mouth/Throat:      Mouth: Mucous membranes are moist.      Pharynx: No posterior oropharyngeal erythema.   Eyes:      Extraocular Movements: Extraocular movements intact.      Conjunctiva/sclera: Conjunctivae normal.   Cardiovascular:      Rate and Rhythm: Normal rate.      Heart sounds: No murmur heard.  Pulmonary:      Effort: No respiratory distress.      Comments: Diminished lung sounds.  Abdominal:      General: Abdomen is flat.   Musculoskeletal:      Right lower leg: No edema.      Left lower leg: No edema.   Skin:     Capillary Refill: Capillary refill takes less than 2 seconds.      Coloration: Skin is not jaundiced.      Findings: No bruising.   Neurological:      General: No focal deficit present.      Mental Status: She is alert and oriented to person, place, and time.      Motor: No weakness.      Gait: Gait normal.   Psychiatric:         Mood and Affect: Mood normal.           Lab Results   Component Value Date     02/06/2023    K 3.4 (L) 02/06/2023    CL 99 02/06/2023    CO2 35 (H) 02/06/2023    BUN 14 02/06/2023    CREATININE 0.7 02/06/2023    ANIONGAP 10 02/06/2023     Lab Results   Component Value Date    HGBA1C 6.0 (H) 07/21/2022     Lab Results   Component Value Date     (H) 02/05/2023     (H) 01/30/2023     (H) 01/01/2023       Lab Results   Component Value Date    WBC 7.57 02/06/2023    HGB 11.9 (L) 02/06/2023    HCT 38.9 02/06/2023    HCT 33 (L) 02/05/2023     02/06/2023    GRAN 4.9 02/06/2023    GRAN 64.3 02/06/2023     Lab Results "   Component Value Date    CHOL 137 07/21/2022    HDL 43 07/21/2022    LDLCALC 71.8 07/21/2022    TRIG 111 07/21/2022          Current Outpatient Medications:     apixaban (ELIQUIS) 2.5 mg Tab, Take 1 tablet (2.5 mg total) by mouth 2 (two) times daily., Disp: 60 tablet, Rfl: 2    diltiaZEM (CARDIZEM CD) 120 MG Cp24, Take 1 capsule (120 mg total) by mouth once daily., Disp: 90 capsule, Rfl: 3    ipratropium (ATROVENT) 0.02 % nebulizer solution, Take 2.5 mLs (500 mcg total) by nebulization every 8 (eight) hours as needed for Wheezing., Disp: 75 mL, Rfl: 3    levalbuterol (XOPENEX) 1.25 mg/3 mL nebulizer solution, Take 3 mLs (1.25 mg total) by nebulization every 8 (eight) hours as needed for Wheezing or Shortness of Breath., Disp: 75 each, Rfl: 2    metoprolol succinate (TOPROL-XL) 50 MG 24 hr tablet, Take 1 tablet (50 mg total) by mouth once daily., Disp: 30 tablet, Rfl: 2    nitroGLYCERIN (NITROSTAT) 0.4 MG SL tablet, PLACE 1 TAB UNDER TONGUE FOR CHEST PAIN EVERY 5 MINUTES UP TO 3 DOSES GO TO ER IF PAIN NOT RELIEVED, Disp: , Rfl: 3    rosuvastatin (CRESTOR) 10 MG tablet, , Disp: , Rfl:     glycopyrrolate-formoteroL (BEVESPI AEROSPHERE) 9-4.8 mcg HFAA, Inhale 2 puffs into the lungs 2 (two) times a day. Controller, Disp: 10.7 g, Rfl: 5    omeprazole (PRILOSEC) 20 MG capsule, Take 1 capsule (20 mg total) by mouth once daily., Disp: 90 capsule, Rfl: 3     Outpatient Encounter Medications as of 4/26/2023   Medication Sig Dispense Refill    apixaban (ELIQUIS) 2.5 mg Tab Take 1 tablet (2.5 mg total) by mouth 2 (two) times daily. 60 tablet 2    diltiaZEM (CARDIZEM CD) 120 MG Cp24 Take 1 capsule (120 mg total) by mouth once daily. 90 capsule 3    ipratropium (ATROVENT) 0.02 % nebulizer solution Take 2.5 mLs (500 mcg total) by nebulization every 8 (eight) hours as needed for Wheezing. 75 mL 3    levalbuterol (XOPENEX) 1.25 mg/3 mL nebulizer solution Take 3 mLs (1.25 mg total) by nebulization every 8 (eight) hours as needed for  Wheezing or Shortness of Breath. 75 each 2    metoprolol succinate (TOPROL-XL) 50 MG 24 hr tablet Take 1 tablet (50 mg total) by mouth once daily. 30 tablet 2    nitroGLYCERIN (NITROSTAT) 0.4 MG SL tablet PLACE 1 TAB UNDER TONGUE FOR CHEST PAIN EVERY 5 MINUTES UP TO 3 DOSES GO TO ER IF PAIN NOT RELIEVED  3    rosuvastatin (CRESTOR) 10 MG tablet       [DISCONTINUED] omeprazole (PRILOSEC) 20 MG capsule Take 20 mg by mouth once daily.  3    [DISCONTINUED] ranitidine (ZANTAC) 150 MG capsule Take 150 mg by mouth 2 (two) times daily.      glycopyrrolate-formoteroL (BEVESPI AEROSPHERE) 9-4.8 mcg HFAA Inhale 2 puffs into the lungs 2 (two) times a day. Controller 10.7 g 5    omeprazole (PRILOSEC) 20 MG capsule Take 1 capsule (20 mg total) by mouth once daily. 90 capsule 3    [DISCONTINUED] albuterol (PROVENTIL/VENTOLIN HFA) 90 mcg/actuation inhaler Inhale 1 puff into the lungs once daily. Rescue (Patient not taking: Reported on 4/26/2023) 18 g 6    [DISCONTINUED] losartan (COZAAR) 25 MG tablet TAKE ONE TABLET BY MOUTH ONCE DAILY (Patient not taking: Reported on 3/21/2023) 90 tablet 3    [DISCONTINUED] tiotropium-olodaterol (STIOLTO RESPIMAT) 2.5-2.5 mcg/actuation Mist Inhale 2 puffs into the lungs once daily. Controller 4 g 6     No facility-administered encounter medications on file as of 4/26/2023.          Assessment:       1. Chronic respiratory failure with hypoxia    2. S/P CABG (coronary artery bypass graft)    3. Atherosclerosis of native coronary artery of native heart without angina pectoris    4. Acute diastolic heart failure    5. Chronic heart failure with preserved ejection fraction    6. Gastroesophageal reflux disease, unspecified whether esophagitis present    7. Other abnormal glucose           Plan:       Chronic respiratory failure with hypoxia  -     glycopyrrolate-formoteroL (BEVESPI AEROSPHERE) 9-4.8 mcg HFAA; Inhale 2 puffs into the lungs 2 (two) times a day. Controller  Dispense: 10.7 g; Refill:  5    S/P CABG (coronary artery bypass graft)  -     CBC Auto Differential; Future; Expected date: 04/26/2023  -     Comprehensive Metabolic Panel; Future; Expected date: 04/26/2023  -     Lipid Panel; Future; Expected date: 04/26/2023  -     Hemoglobin A1C; Future; Expected date: 04/26/2023  -     TSH; Future; Expected date: 04/26/2023    Atherosclerosis of native coronary artery of native heart without angina pectoris  -     CBC Auto Differential; Future; Expected date: 04/26/2023  -     Comprehensive Metabolic Panel; Future; Expected date: 04/26/2023  -     Lipid Panel; Future; Expected date: 04/26/2023  -     Hemoglobin A1C; Future; Expected date: 04/26/2023  -     TSH; Future; Expected date: 04/26/2023    Acute diastolic heart failure  -     CBC Auto Differential; Future; Expected date: 04/26/2023  -     Comprehensive Metabolic Panel; Future; Expected date: 04/26/2023  -     Lipid Panel; Future; Expected date: 04/26/2023  -     Hemoglobin A1C; Future; Expected date: 04/26/2023  -     TSH; Future; Expected date: 04/26/2023    Chronic heart failure with preserved ejection fraction  -     CBC Auto Differential; Future; Expected date: 04/26/2023  -     Comprehensive Metabolic Panel; Future; Expected date: 04/26/2023  -     Lipid Panel; Future; Expected date: 04/26/2023  -     Hemoglobin A1C; Future; Expected date: 04/26/2023  -     TSH; Future; Expected date: 04/26/2023    Gastroesophageal reflux disease, unspecified whether esophagitis present  -     omeprazole (PRILOSEC) 20 MG capsule; Take 1 capsule (20 mg total) by mouth once daily.  Dispense: 90 capsule; Refill: 3    Other abnormal glucose  -     Hemoglobin A1C; Future; Expected date: 04/26/2023                 Est Care:   - new patient presents with her daughter.    - hx of heart disease, Afib, COPD, GERD, HTN, HLD.     COPD:   - starting patient on new inhailer to use 2 puffs twice daily.   - using O2 at 2L continuous.   - mild nose bleeds infrequently. Can  use AYR nasal saline.   - PRN nebulizer use.    GERD:   - refilling PPI for daily use at this time.    HTN:    - BP controlled, on low side.     - do not restart Losartan at this time.   - keep watch of BP at home.  Continue with Metoprolol and Diltiazem.    Right Arm Rash:   - 2 weeks of right arm.   - likely Shingles.  Had pain/itching, right arm w/ rash then blisters that ruptured.  Now scabbing.   - will continue to monitor, expect to improve with time.

## 2023-05-01 PROBLEM — J96.11 CHRONIC RESPIRATORY FAILURE WITH HYPOXIA: Status: RESOLVED | Noted: 2019-12-27 | Resolved: 2023-05-01

## 2023-08-18 ENCOUNTER — OFFICE VISIT (OUTPATIENT)
Dept: PRIMARY CARE CLINIC | Facility: CLINIC | Age: 83
End: 2023-08-18
Payer: MEDICARE

## 2023-08-18 VITALS
HEIGHT: 63 IN | TEMPERATURE: 98 F | BODY MASS INDEX: 19.64 KG/M2 | SYSTOLIC BLOOD PRESSURE: 132 MMHG | OXYGEN SATURATION: 93 % | DIASTOLIC BLOOD PRESSURE: 58 MMHG | RESPIRATION RATE: 14 BRPM | WEIGHT: 110.88 LBS | HEART RATE: 78 BPM

## 2023-08-18 DIAGNOSIS — J44.1 COPD EXACERBATION: ICD-10-CM

## 2023-08-18 DIAGNOSIS — Z95.1 S/P CABG (CORONARY ARTERY BYPASS GRAFT): ICD-10-CM

## 2023-08-18 DIAGNOSIS — I48.19 PERSISTENT ATRIAL FIBRILLATION: Primary | ICD-10-CM

## 2023-08-18 DIAGNOSIS — J43.8 OTHER EMPHYSEMA: ICD-10-CM

## 2023-08-18 PROCEDURE — 99214 OFFICE O/P EST MOD 30 MIN: CPT | Mod: HCNC,S$GLB,, | Performed by: STUDENT IN AN ORGANIZED HEALTH CARE EDUCATION/TRAINING PROGRAM

## 2023-08-18 PROCEDURE — 3288F PR FALLS RISK ASSESSMENT DOCUMENTED: ICD-10-PCS | Mod: HCNC,CPTII,S$GLB, | Performed by: STUDENT IN AN ORGANIZED HEALTH CARE EDUCATION/TRAINING PROGRAM

## 2023-08-18 PROCEDURE — 1126F PR PAIN SEVERITY QUANTIFIED, NO PAIN PRESENT: ICD-10-PCS | Mod: HCNC,CPTII,S$GLB, | Performed by: STUDENT IN AN ORGANIZED HEALTH CARE EDUCATION/TRAINING PROGRAM

## 2023-08-18 PROCEDURE — 1101F PT FALLS ASSESS-DOCD LE1/YR: CPT | Mod: HCNC,CPTII,S$GLB, | Performed by: STUDENT IN AN ORGANIZED HEALTH CARE EDUCATION/TRAINING PROGRAM

## 2023-08-18 PROCEDURE — 1159F MED LIST DOCD IN RCRD: CPT | Mod: HCNC,CPTII,S$GLB, | Performed by: STUDENT IN AN ORGANIZED HEALTH CARE EDUCATION/TRAINING PROGRAM

## 2023-08-18 PROCEDURE — 99214 PR OFFICE/OUTPT VISIT, EST, LEVL IV, 30-39 MIN: ICD-10-PCS | Mod: HCNC,S$GLB,, | Performed by: STUDENT IN AN ORGANIZED HEALTH CARE EDUCATION/TRAINING PROGRAM

## 2023-08-18 PROCEDURE — 1160F RVW MEDS BY RX/DR IN RCRD: CPT | Mod: HCNC,CPTII,S$GLB, | Performed by: STUDENT IN AN ORGANIZED HEALTH CARE EDUCATION/TRAINING PROGRAM

## 2023-08-18 PROCEDURE — 3288F FALL RISK ASSESSMENT DOCD: CPT | Mod: HCNC,CPTII,S$GLB, | Performed by: STUDENT IN AN ORGANIZED HEALTH CARE EDUCATION/TRAINING PROGRAM

## 2023-08-18 PROCEDURE — 99999 PR PBB SHADOW E&M-EST. PATIENT-LVL V: ICD-10-PCS | Mod: PBBFAC,HCNC,, | Performed by: STUDENT IN AN ORGANIZED HEALTH CARE EDUCATION/TRAINING PROGRAM

## 2023-08-18 PROCEDURE — 1159F PR MEDICATION LIST DOCUMENTED IN MEDICAL RECORD: ICD-10-PCS | Mod: HCNC,CPTII,S$GLB, | Performed by: STUDENT IN AN ORGANIZED HEALTH CARE EDUCATION/TRAINING PROGRAM

## 2023-08-18 PROCEDURE — 1160F PR REVIEW ALL MEDS BY PRESCRIBER/CLIN PHARMACIST DOCUMENTED: ICD-10-PCS | Mod: HCNC,CPTII,S$GLB, | Performed by: STUDENT IN AN ORGANIZED HEALTH CARE EDUCATION/TRAINING PROGRAM

## 2023-08-18 PROCEDURE — 1101F PR PT FALLS ASSESS DOC 0-1 FALLS W/OUT INJ PAST YR: ICD-10-PCS | Mod: HCNC,CPTII,S$GLB, | Performed by: STUDENT IN AN ORGANIZED HEALTH CARE EDUCATION/TRAINING PROGRAM

## 2023-08-18 PROCEDURE — 3078F DIAST BP <80 MM HG: CPT | Mod: HCNC,CPTII,S$GLB, | Performed by: STUDENT IN AN ORGANIZED HEALTH CARE EDUCATION/TRAINING PROGRAM

## 2023-08-18 PROCEDURE — 3078F PR MOST RECENT DIASTOLIC BLOOD PRESSURE < 80 MM HG: ICD-10-PCS | Mod: HCNC,CPTII,S$GLB, | Performed by: STUDENT IN AN ORGANIZED HEALTH CARE EDUCATION/TRAINING PROGRAM

## 2023-08-18 PROCEDURE — 3075F SYST BP GE 130 - 139MM HG: CPT | Mod: HCNC,CPTII,S$GLB, | Performed by: STUDENT IN AN ORGANIZED HEALTH CARE EDUCATION/TRAINING PROGRAM

## 2023-08-18 PROCEDURE — 99999 PR PBB SHADOW E&M-EST. PATIENT-LVL V: CPT | Mod: PBBFAC,HCNC,, | Performed by: STUDENT IN AN ORGANIZED HEALTH CARE EDUCATION/TRAINING PROGRAM

## 2023-08-18 PROCEDURE — 1126F AMNT PAIN NOTED NONE PRSNT: CPT | Mod: HCNC,CPTII,S$GLB, | Performed by: STUDENT IN AN ORGANIZED HEALTH CARE EDUCATION/TRAINING PROGRAM

## 2023-08-18 PROCEDURE — 3075F PR MOST RECENT SYSTOLIC BLOOD PRESS GE 130-139MM HG: ICD-10-PCS | Mod: HCNC,CPTII,S$GLB, | Performed by: STUDENT IN AN ORGANIZED HEALTH CARE EDUCATION/TRAINING PROGRAM

## 2023-08-18 RX ORDER — GLYCOPYRROLATE AND FORMOTEROL FUMARATE 9; 4.8 UG/1; UG/1
2 AEROSOL, METERED RESPIRATORY (INHALATION) 2 TIMES DAILY
Qty: 10.7 G | Refills: 2 | Status: SHIPPED | OUTPATIENT
Start: 2023-08-18 | End: 2023-11-28 | Stop reason: SDUPTHER

## 2023-08-18 RX ORDER — FLUTICASONE FUROATE, UMECLIDINIUM BROMIDE AND VILANTEROL TRIFENATATE 200; 62.5; 25 UG/1; UG/1; UG/1
1 POWDER RESPIRATORY (INHALATION) DAILY
Qty: 60 EACH | Refills: 3 | Status: SHIPPED | OUTPATIENT
Start: 2023-08-18 | End: 2023-08-18

## 2023-08-18 NOTE — PROGRESS NOTES
Subjective:           Patient ID: Ann Marie Hirsch is a 83 y.o. female who presents today with a chief complaint of Follow-up (Pt is here for a 3 month follow up regarding (GERD, HTN,and Copd)//Pt is also complains orf hospital putting her two new medications and they are making her feel dizzy and weak after a hour of taking ).    Chief Complaint:   Follow-up (Pt is here for a 3 month follow up regarding (GERD, HTN,and Copd)//Pt is also complains orf hospital putting her two new medications and they are making her feel dizzy and weak after a hour of taking )      History of Present Illness:    83 y.o. female who presents today with a chief complaint of Follow-up (Pt is here for a 3 month follow up regarding (GERD, HTN,and Copd)//Pt is also complains orf hospital putting her two new medications and they are making her feel dizzy and weak after a hour of taking ).    States was started on Elequis 2.5mg BID and Diltiazem 120mg daily.  States she feel dizzy and lightheaded.  Gets a nauseous feeling.    States that her Elequis was $46 but then that was increased to $76 this last month.      GERD:   - patient with history of acid reflux.  Current medication includes Prilosec 20 mg daily.      Hypertension:   - history of elevated blood pressure.  Blood pressure is 145/56 on rooming.   - current meds include metoprolol succinate 50 mg daily, diltiazem 120 mg daily.   - states she has been having some dizziness/lightheadedness times.      COPD:   - current meds include glycopyrrolate-formoterol (Bevespi) ipratropium nebulizer, Xopenex nebulizer.    States that her breathing is not that good.   Is not taking her Bevespi due to it costing too much, states costs $100 and she does not have that money.  Nothing is helping.       Review of Systems   Constitutional:  Positive for fatigue and fever. Negative for activity change and unexpected weight change.   HENT:  Negative for congestion, nosebleeds, sinus pressure and sneezing.   "  Respiratory:  Positive for cough, shortness of breath and wheezing.    Cardiovascular:  Negative for chest pain, palpitations and leg swelling.   Gastrointestinal:  Negative for abdominal distention, constipation, diarrhea and nausea.   Genitourinary:  Negative for difficulty urinating and dysuria.   Musculoskeletal:  Negative for back pain and gait problem.   Skin:  Positive for rash. Negative for pallor.   Neurological:  Positive for dizziness and light-headedness. Negative for weakness, numbness and headaches.   Psychiatric/Behavioral:  Negative for agitation. The patient is not nervous/anxious.            Objective:        Vitals:    08/18/23 1054 08/18/23 1145   BP: (!) 145/56 (!) 132/58   BP Location: Right arm Right arm   Patient Position: Sitting Sitting   BP Method: Medium (Automatic) Medium (Manual)   Pulse: 78    Resp: 14    Temp: 97.7 °F (36.5 °C)    TempSrc: Temporal    SpO2: (!) 79% (!) 93%   Weight: 50.3 kg (110 lb 14.3 oz)    Height: 5' 3" (1.6 m)        Body mass index is 19.64 kg/m².      Physical Exam  Constitutional:       General: She is not in acute distress.     Appearance: Normal appearance. She is ill-appearing.   HENT:      Head: Normocephalic and atraumatic.      Right Ear: External ear normal.      Left Ear: External ear normal.      Nose: No rhinorrhea.      Mouth/Throat:      Mouth: Mucous membranes are moist.      Pharynx: No posterior oropharyngeal erythema.   Eyes:      Extraocular Movements: Extraocular movements intact.      Conjunctiva/sclera: Conjunctivae normal.   Cardiovascular:      Rate and Rhythm: Normal rate and regular rhythm.      Heart sounds: No murmur heard.  Pulmonary:      Effort: No respiratory distress.      Breath sounds: Wheezing present.      Comments: Increased work of breathing, using 2 L via nasal cannula.    Wheezes to anterior chest wall diffusely.  Diminished lung sounds on back.  Abdominal:      General: Abdomen is flat.   Musculoskeletal:      Right " lower leg: No edema.      Left lower leg: No edema.   Skin:     Capillary Refill: Capillary refill takes less than 2 seconds.      Coloration: Skin is not jaundiced.      Findings: No bruising.   Neurological:      General: No focal deficit present.      Mental Status: She is alert and oriented to person, place, and time.      Motor: No weakness.      Gait: Gait normal.   Psychiatric:         Mood and Affect: Mood normal.             Lab Results   Component Value Date     05/03/2023    K 4.2 05/03/2023     05/03/2023    CO2 29 05/03/2023    BUN 16 05/03/2023    CREATININE 0.7 05/03/2023    ANIONGAP 10 05/03/2023     Lab Results   Component Value Date    HGBA1C 6.0 (H) 05/03/2023     Lab Results   Component Value Date     (H) 02/05/2023     (H) 01/30/2023     (H) 01/01/2023       Lab Results   Component Value Date    WBC 6.54 05/03/2023    HGB 13.4 05/03/2023    HCT 43.6 05/03/2023    HCT 33 (L) 02/05/2023     05/03/2023    GRAN 4.7 05/03/2023    GRAN 71.7 05/03/2023     Lab Results   Component Value Date    CHOL 145 05/03/2023    HDL 52 05/03/2023    LDLCALC 72.8 05/03/2023    TRIG 101 05/03/2023          Current Outpatient Medications:     ELIQUIS 2.5 mg Tab, TAKE ONE TABLET BY MOUTH TWICE DAILY, Disp: 60 tablet, Rfl: 2    ipratropium (ATROVENT) 0.02 % nebulizer solution, Take 2.5 mLs (500 mcg total) by nebulization every 8 (eight) hours as needed for Wheezing., Disp: 75 mL, Rfl: 3    levalbuterol (XOPENEX) 1.25 mg/3 mL nebulizer solution, Take 3 mLs (1.25 mg total) by nebulization every 8 (eight) hours as needed for Wheezing or Shortness of Breath., Disp: 75 each, Rfl: 2    metoprolol succinate (TOPROL-XL) 50 MG 24 hr tablet, Take 1 tablet (50 mg total) by mouth once daily., Disp: 30 tablet, Rfl: 2    nitroGLYCERIN (NITROSTAT) 0.4 MG SL tablet, PLACE 1 TAB UNDER TONGUE FOR CHEST PAIN EVERY 5 MINUTES UP TO 3 DOSES GO TO ER IF PAIN NOT RELIEVED, Disp: , Rfl: 3    omeprazole  (PRILOSEC) 20 MG capsule, Take 1 capsule (20 mg total) by mouth once daily., Disp: 90 capsule, Rfl: 3    rosuvastatin (CRESTOR) 10 MG tablet, TAKE ONE TABLET BY MOUTH ONCE DAILY, Disp: 90 tablet, Rfl: 3    diltiaZEM (CARDIZEM CD) 120 MG Cp24, Take 1 capsule (120 mg total) by mouth once daily., Disp: 90 capsule, Rfl: 3    glycopyrrolate-formoteroL (BEVESPI AEROSPHERE) 9-4.8 mcg HFAA, Inhale 2 puffs into the lungs 2 (two) times a day. Controller, Disp: 10.7 g, Rfl: 2     Outpatient Encounter Medications as of 8/18/2023   Medication Sig Dispense Refill    ELIQUIS 2.5 mg Tab TAKE ONE TABLET BY MOUTH TWICE DAILY 60 tablet 2    ipratropium (ATROVENT) 0.02 % nebulizer solution Take 2.5 mLs (500 mcg total) by nebulization every 8 (eight) hours as needed for Wheezing. 75 mL 3    levalbuterol (XOPENEX) 1.25 mg/3 mL nebulizer solution Take 3 mLs (1.25 mg total) by nebulization every 8 (eight) hours as needed for Wheezing or Shortness of Breath. 75 each 2    metoprolol succinate (TOPROL-XL) 50 MG 24 hr tablet Take 1 tablet (50 mg total) by mouth once daily. 30 tablet 2    nitroGLYCERIN (NITROSTAT) 0.4 MG SL tablet PLACE 1 TAB UNDER TONGUE FOR CHEST PAIN EVERY 5 MINUTES UP TO 3 DOSES GO TO ER IF PAIN NOT RELIEVED  3    omeprazole (PRILOSEC) 20 MG capsule Take 1 capsule (20 mg total) by mouth once daily. 90 capsule 3    rosuvastatin (CRESTOR) 10 MG tablet TAKE ONE TABLET BY MOUTH ONCE DAILY 90 tablet 3    [DISCONTINUED] glycopyrrolate-formoteroL (BEVESPI AEROSPHERE) 9-4.8 mcg HFAA Inhale 2 puffs into the lungs 2 (two) times a day. Controller 10.7 g 5    diltiaZEM (CARDIZEM CD) 120 MG Cp24 Take 1 capsule (120 mg total) by mouth once daily. 90 capsule 3    glycopyrrolate-formoteroL (BEVESPI AEROSPHERE) 9-4.8 mcg HFAA Inhale 2 puffs into the lungs 2 (two) times a day. Controller 10.7 g 2    [DISCONTINUED] fluticasone-umeclidin-vilanter (TRELEGY ELLIPTA) 200-62.5-25 mcg inhaler Inhale 1 puff into the lungs once daily. 60 each 3     [DISCONTINUED] rosuvastatin (CRESTOR) 10 MG tablet        No facility-administered encounter medications on file as of 8/18/2023.          Assessment:       1. Persistent atrial fibrillation    2. COPD exacerbation    3. Other emphysema    4. S/P CABG (coronary artery bypass graft)           Plan:       Persistent atrial fibrillation  -     Ambulatory referral/consult to Cardiology; Future; Expected date: 08/25/2023    COPD exacerbation  -     Discontinue: fluticasone-umeclidin-vilanter (TRELEGY ELLIPTA) 200-62.5-25 mcg inhaler; Inhale 1 puff into the lungs once daily.  Dispense: 60 each; Refill: 3  -     Ambulatory referral/consult to Pulmonology; Future; Expected date: 08/25/2023  -     glycopyrrolate-formoteroL (BEVESPI AEROSPHERE) 9-4.8 mcg HFAA; Inhale 2 puffs into the lungs 2 (two) times a day. Controller  Dispense: 10.7 g; Refill: 2    Other emphysema  -     Discontinue: fluticasone-umeclidin-vilanter (TRELEGY ELLIPTA) 200-62.5-25 mcg inhaler; Inhale 1 puff into the lungs once daily.  Dispense: 60 each; Refill: 3  -     Ambulatory referral/consult to Pulmonology; Future; Expected date: 08/25/2023  -     glycopyrrolate-formoteroL (BEVESPI AEROSPHERE) 9-4.8 mcg HFAA; Inhale 2 puffs into the lungs 2 (two) times a day. Controller  Dispense: 10.7 g; Refill: 2    S/P CABG (coronary artery bypass graft)                 Afib w/ RVR:   - 83-year-old female presenting to clinic for follow-up on atrial fibrillation with rapid ventricular response, has been taking diltiazem and Eliquis.   - patient would like to cease use of Eliquis due to cost.  States it used to be 46 dollars now 76 dollars per month.  However was advised she would need to speak to Cardiology and have the heart evaluated.  Encouraged her to try and make an appointment with electrophysiology to consider getting an ablation then she may not need to be on this medication chronically.  However patient states she has issues getting transportation across  town and is not certain she will be able to attend appointment outside of [place].    Emphysema/COPD:   - patient SOB on 2L NC.   - using albuterol and nebs TID but not using control med due to cost.   - referral to pulm as has increased WOB, and wheezes.   - reordered Trelegy as Bevespi was too costly, but do not think she will be able to get either without assistance.     Financial concerns:   - patient has not been taking meds including inhalers due to financial concerns.  Additionally patient did not attend her appointment with electrophysiology in [place] due to issues with transportation.    GERD:   - patient with history of acid reflux.  Current medication includes Prilosec 20 mg daily.

## 2023-08-18 NOTE — PATIENT INSTRUCTIONS
Afib w/ RVR:   - 83-year-old female presenting to clinic for follow-up on atrial fibrillation with rapid ventricular response, has been taking diltiazem and Eliquis.   - patient would like to cease use of Eliquis due to cost.  States it used to be 46 dollars now 76 dollars per month.  However was advised she would need to speak to Cardiology and have the heart evaluated.  Encouraged her to try and make an appointment with electrophysiology to consider getting an ablation then she may not need to be on this medication chronically.  However patient states she has issues getting transportation across town and is not certain she will be able to attend appointment outside of [place].    Emphysema/COPD:   - patient SOB on 2L NC.   - using albuterol and nebs TID but not using control med due to cost.   - referral to pulm as has increased WOB, and wheezes.   - reordered Trelegy as Bevespi was too costly, but do not think she will be able to get either without assistance.     Financial concerns:   - patient has not been taking meds including inhalers due to financial concerns.  Additionally patient did not attend her appointment with electrophysiology in [place] due to issues with transportation.    GERD:   - patient with history of acid reflux.  Current medication includes Prilosec 20 mg daily.

## 2023-08-23 DIAGNOSIS — I48.19 PERSISTENT ATRIAL FIBRILLATION: Primary | ICD-10-CM

## 2023-08-23 RX ORDER — METOPROLOL SUCCINATE 50 MG/1
50 TABLET, EXTENDED RELEASE ORAL DAILY
Qty: 30 TABLET | Refills: 5 | Status: SHIPPED | OUTPATIENT
Start: 2023-08-23 | End: 2024-01-20

## 2023-08-23 NOTE — TELEPHONE ENCOUNTER
No care due was identified.  [facility] Embedded Care Due Messages. Reference number: [id number].   8/23/2023 4:38:23 PM CDT

## 2023-08-23 NOTE — TELEPHONE ENCOUNTER
----- Message from Angela Membreno sent at 8/23/2023  3:36 PM CDT -----  Contact: Zenobia ibrahim/ [facility]   Zenobia from Pharmacy is calling to make sure Dr Ruiz would be the doctor to fill this medication. Pt is out and is asking if it can be filled ASAP. Pt is leaving to go out of town tomorrow morning. Thank you    Requesting an RX refill or new RX.  Is this a refill or new RX: new  RX name and strength (copy/paste from chart):  metoprolol succinate (TOPROL-XL) 50 MG 24 hr tablet  Is this a 30 day or 90 day RX:   Pharmacy name and phone # (copy/paste from chart):    [facility] - [facility] [address]  [address] [facility] [address] [address]  Phone: [phone] Fax: [phone]

## 2023-09-18 ENCOUNTER — PATIENT MESSAGE (OUTPATIENT)
Dept: PRIMARY CARE CLINIC | Facility: CLINIC | Age: 83
End: 2023-09-18
Payer: MEDICARE

## 2023-09-22 ENCOUNTER — OFFICE VISIT (OUTPATIENT)
Dept: CARDIOLOGY | Facility: CLINIC | Age: 83
End: 2023-09-22
Payer: MEDICARE

## 2023-09-22 VITALS
HEIGHT: 63 IN | BODY MASS INDEX: 18.61 KG/M2 | SYSTOLIC BLOOD PRESSURE: 135 MMHG | WEIGHT: 105 LBS | DIASTOLIC BLOOD PRESSURE: 70 MMHG | OXYGEN SATURATION: 92 % | HEART RATE: 64 BPM

## 2023-09-22 DIAGNOSIS — I25.10 CORONARY ARTERY DISEASE INVOLVING NATIVE HEART WITHOUT ANGINA PECTORIS, UNSPECIFIED VESSEL OR LESION TYPE: ICD-10-CM

## 2023-09-22 DIAGNOSIS — I25.10 ATHEROSCLEROSIS OF NATIVE CORONARY ARTERY OF NATIVE HEART WITHOUT ANGINA PECTORIS: ICD-10-CM

## 2023-09-22 DIAGNOSIS — I48.19 PERSISTENT ATRIAL FIBRILLATION: ICD-10-CM

## 2023-09-22 DIAGNOSIS — I10 HYPERTENSION, UNSPECIFIED TYPE: ICD-10-CM

## 2023-09-22 DIAGNOSIS — I73.9 PERIPHERAL ARTERY DISEASE: ICD-10-CM

## 2023-09-22 DIAGNOSIS — I65.29 STENOSIS OF CAROTID ARTERY, UNSPECIFIED LATERALITY: ICD-10-CM

## 2023-09-22 DIAGNOSIS — I50.31 ACUTE DIASTOLIC HEART FAILURE: ICD-10-CM

## 2023-09-22 DIAGNOSIS — I50.32 CHRONIC HEART FAILURE WITH PRESERVED EJECTION FRACTION: Primary | ICD-10-CM

## 2023-09-22 DIAGNOSIS — E78.5 HYPERLIPIDEMIA, UNSPECIFIED HYPERLIPIDEMIA TYPE: ICD-10-CM

## 2023-09-22 DIAGNOSIS — R09.89 CAROTID BRUIT, UNSPECIFIED LATERALITY: ICD-10-CM

## 2023-09-22 DIAGNOSIS — Z95.1 S/P CABG (CORONARY ARTERY BYPASS GRAFT): ICD-10-CM

## 2023-09-22 PROCEDURE — 1159F MED LIST DOCD IN RCRD: CPT | Mod: HCNC,CPTII,S$GLB, | Performed by: INTERNAL MEDICINE

## 2023-09-22 PROCEDURE — 99215 OFFICE O/P EST HI 40 MIN: CPT | Mod: HCNC,S$GLB,, | Performed by: INTERNAL MEDICINE

## 2023-09-22 PROCEDURE — 93000 ELECTROCARDIOGRAM COMPLETE: CPT | Mod: HCNC,S$GLB,, | Performed by: INTERNAL MEDICINE

## 2023-09-22 PROCEDURE — 1126F AMNT PAIN NOTED NONE PRSNT: CPT | Mod: HCNC,CPTII,S$GLB, | Performed by: INTERNAL MEDICINE

## 2023-09-22 PROCEDURE — 99215 PR OFFICE/OUTPT VISIT, EST, LEVL V, 40-54 MIN: ICD-10-PCS | Mod: HCNC,S$GLB,, | Performed by: INTERNAL MEDICINE

## 2023-09-22 PROCEDURE — 99999 PR PBB SHADOW E&M-EST. PATIENT-LVL III: ICD-10-PCS | Mod: PBBFAC,HCNC,, | Performed by: INTERNAL MEDICINE

## 2023-09-22 PROCEDURE — 1159F PR MEDICATION LIST DOCUMENTED IN MEDICAL RECORD: ICD-10-PCS | Mod: HCNC,CPTII,S$GLB, | Performed by: INTERNAL MEDICINE

## 2023-09-22 PROCEDURE — 1160F RVW MEDS BY RX/DR IN RCRD: CPT | Mod: HCNC,CPTII,S$GLB, | Performed by: INTERNAL MEDICINE

## 2023-09-22 PROCEDURE — 1101F PT FALLS ASSESS-DOCD LE1/YR: CPT | Mod: HCNC,CPTII,S$GLB, | Performed by: INTERNAL MEDICINE

## 2023-09-22 PROCEDURE — 93000 EKG 12-LEAD: ICD-10-PCS | Mod: HCNC,S$GLB,, | Performed by: INTERNAL MEDICINE

## 2023-09-22 PROCEDURE — 3288F FALL RISK ASSESSMENT DOCD: CPT | Mod: HCNC,CPTII,S$GLB, | Performed by: INTERNAL MEDICINE

## 2023-09-22 PROCEDURE — 1126F PR PAIN SEVERITY QUANTIFIED, NO PAIN PRESENT: ICD-10-PCS | Mod: HCNC,CPTII,S$GLB, | Performed by: INTERNAL MEDICINE

## 2023-09-22 PROCEDURE — 99999 PR PBB SHADOW E&M-EST. PATIENT-LVL III: CPT | Mod: PBBFAC,HCNC,, | Performed by: INTERNAL MEDICINE

## 2023-09-22 PROCEDURE — 3075F SYST BP GE 130 - 139MM HG: CPT | Mod: HCNC,CPTII,S$GLB, | Performed by: INTERNAL MEDICINE

## 2023-09-22 PROCEDURE — 3078F DIAST BP <80 MM HG: CPT | Mod: HCNC,CPTII,S$GLB, | Performed by: INTERNAL MEDICINE

## 2023-09-22 PROCEDURE — 3288F PR FALLS RISK ASSESSMENT DOCUMENTED: ICD-10-PCS | Mod: HCNC,CPTII,S$GLB, | Performed by: INTERNAL MEDICINE

## 2023-09-22 PROCEDURE — 1101F PR PT FALLS ASSESS DOC 0-1 FALLS W/OUT INJ PAST YR: ICD-10-PCS | Mod: HCNC,CPTII,S$GLB, | Performed by: INTERNAL MEDICINE

## 2023-09-22 PROCEDURE — 1160F PR REVIEW ALL MEDS BY PRESCRIBER/CLIN PHARMACIST DOCUMENTED: ICD-10-PCS | Mod: HCNC,CPTII,S$GLB, | Performed by: INTERNAL MEDICINE

## 2023-09-22 PROCEDURE — 3078F PR MOST RECENT DIASTOLIC BLOOD PRESSURE < 80 MM HG: ICD-10-PCS | Mod: HCNC,CPTII,S$GLB, | Performed by: INTERNAL MEDICINE

## 2023-09-22 PROCEDURE — 3075F PR MOST RECENT SYSTOLIC BLOOD PRESS GE 130-139MM HG: ICD-10-PCS | Mod: HCNC,CPTII,S$GLB, | Performed by: INTERNAL MEDICINE

## 2023-09-22 NOTE — PROGRESS NOTES
[facility] [address]  Cardiology Clinic Note      Chief Complaint  Chief Complaint   Patient presents with    Follow-up       HPI:    83-year-old female with past medical history of hiatal hernia, COPD with chronic respiratory failure on home oxygen, hypertension, hyperlipidemia, impaired fasting glucose, carotid stenosis, CAD status post CABG no operative note slidell 2015, peripheral artery disease with occlusion of the right common iliac artery by CT 2023, HFpEF INOCENCIA 02/01/2023 normal EF normal RV while left atrial enlargement mild mitral regurgitation moderate aortic regurgitation mild tricuspid regurgitation no left atrial appendage thrombus aortic plaque visualized prior echo 01/03/2023 normal LVEF mild LVH grade 2 diastolic dysfunction moderate aortic valve regurgitation mild mitral valve regurgitation normal RV mild to moderate tricuspid valve regurgitation PASP 58 CVP 8     Recently hospitalized with decompensated heart failure and atrial fibrillation with RVR status post INOCENCIA cardioversion which was unsuccessful as an inpatient thus amiodarone was discontinued given chronic lung disease course complicated by demand ischemia.  Held the aspirin as patient was on Eliquis.    Remains with chronic dyspnea on exertion on home oxygen due to chronic lung disease.    Sent to electrophysiology for atrial fibrillation    Blood pressure repeated after a period of rest and was 135/70    Patient states blood pressure log is always normal at home    Does not appear to be taking losartan anymore    Medications  Current Outpatient Medications   Medication Sig Dispense Refill    diltiaZEM (CARDIZEM CD) 120 MG Cp24 Take 1 capsule (120 mg total) by mouth once daily. 90 capsule 3    ELIQUIS 2.5 mg Tab TAKE ONE TABLET BY MOUTH TWICE DAILY 60 tablet 2    glycopyrrolate-formoteroL (BEVESPI AEROSPHERE) 9-4.8 mcg HFAA Inhale 2 puffs into the lungs 2 (two) times a day. Controller 10.7 g 2    ipratropium (ATROVENT) 0.02 %  nebulizer solution Take 2.5 mLs (500 mcg total) by nebulization every 8 (eight) hours as needed for Wheezing. 75 mL 3    levalbuterol (XOPENEX) 1.25 mg/3 mL nebulizer solution Take 3 mLs (1.25 mg total) by nebulization every 8 (eight) hours as needed for Wheezing or Shortness of Breath. 75 mL 2    metoprolol succinate (TOPROL-XL) 50 MG 24 hr tablet Take 1 tablet (50 mg total) by mouth once daily. 30 tablet 5    nitroGLYCERIN (NITROSTAT) 0.4 MG SL tablet PLACE 1 TAB UNDER TONGUE FOR CHEST PAIN EVERY 5 MINUTES UP TO 3 DOSES GO TO ER IF PAIN NOT RELIEVED  3    omeprazole (PRILOSEC) 20 MG capsule Take 1 capsule (20 mg total) by mouth once daily. 90 capsule 3    rosuvastatin (CRESTOR) 10 MG tablet TAKE ONE TABLET BY MOUTH ONCE DAILY 90 tablet 3     No current facility-administered medications for this visit.        History  Past Medical History:   Diagnosis Date    COPD (chronic obstructive pulmonary disease)     Hyperlipidemia     Hypertension      Past Surgical History:   Procedure Laterality Date    ESOPHAGOGASTRODUODENOSCOPY N/A 2023    Procedure: EGD (ESOPHAGOGASTRODUODENOSCOPY);  Surgeon: Gonsalo Esquivel MD;  Location: [facility] ENDO;  Service: Endoscopy;  Laterality: N/A;    gall stone      HEMORRHOID SURGERY      open heart surgery      TRANSESOPHAGEAL ECHOCARDIOGRAM WITH POSSIBLE CARDIOVERSION (INOCENCIA W/ POSS CARDIOVERSION) N/A 2023    Procedure: Transesophageal echo (INOCENCIA) intra-procedure log documentation;  Surgeon: Pawel Gutierrez MD;  Location: [facility] CATH LAB;  Service: Cardiology;  Laterality: N/A;    TREATMENT OF CARDIAC ARRHYTHMIA N/A 2023    Procedure: Cardioversion or Defibrillation;  Surgeon: Pawel Gutierrez MD;  Location: [facility] CATH LAB;  Service: Cardiology;  Laterality: N/A;     Social History     Socioeconomic History    Marital status:    Tobacco Use    Smoking status: Former     Current packs/day: 0.00     Types: Cigarettes     Quit date: 6/3/2012     Years since quittin.3     Smokeless tobacco: Never   Substance and Sexual Activity    Alcohol use: No    Drug use: Never     Social Determinants of Health     Financial Resource Strain: Low Risk  (1/31/2023)    Overall Financial Resource Strain (CARDIA)     Difficulty of Paying Living Expenses: Not hard at all   Food Insecurity: No Food Insecurity (1/31/2023)    Hunger Vital Sign     Worried About Running Out of Food in the Last Year: Never true     Ran Out of Food in the Last Year: Never true   Transportation Needs: No Transportation Needs (1/31/2023)    PRAPARE - Transportation     Lack of Transportation (Medical): No     Lack of Transportation (Non-Medical): No   Stress: No Stress Concern Present (1/31/2023)    [employer] - Occupational Stress Questionnaire     Feeling of Stress : Not at all   Social Connections: Moderately Isolated (1/31/2023)    Social Connection and Isolation Panel [NHANES]     Frequency of Communication with Friends and Family: More than three times a week     Frequency of Social Gatherings with Friends and Family: More than three times a week     Attends Nondenominational Services: Never     Active Member of Clubs or Organizations: Yes     Attends Club or Organization Meetings: Never     Marital Status:    Housing Stability: Low Risk  (1/31/2023)    Housing Stability Vital Sign     Unable to Pay for Housing in the Last Year: No     Number of Places Lived in the Last Year: 1     Unstable Housing in the Last Year: No     Family History   Problem Relation Age of Onset    Cancer Sister     Colon cancer Brother     Lung cancer Neg Hx     Heart attack Neg Hx     Stroke Neg Hx     Alzheimer's disease Neg Hx         Allergies  Review of patient's allergies indicates:  No Known Allergies    Review of Systems   Review of Systems   Constitutional: Negative for fever.   HENT:  Negative for nosebleeds.    Eyes:  Negative for visual disturbance.   Cardiovascular:  Positive for dyspnea on exertion.  Negative for chest pain, claudication, palpitations and syncope.   Respiratory:  Negative for cough, hemoptysis and wheezing.    Endocrine: Negative for cold intolerance, heat intolerance, polyphagia and polyuria.   Hematologic/Lymphatic: Negative for bleeding problem.   Skin:  Negative for rash.   Musculoskeletal:  Negative for myalgias.   Gastrointestinal:  Negative for hematemesis, hematochezia, nausea and vomiting.   Genitourinary:  Negative for dysuria.   Neurological:  Negative for focal weakness and sensory change.   Psychiatric/Behavioral:  Negative for altered mental status.        Physical Exam  Vitals:    09/22/23 1352   BP: (!) 160/65   Pulse: 64     Wt Readings from Last 1 Encounters:   09/22/23 47.6 kg (105 lb)     Physical Exam  Constitutional:       General: She is not in acute distress.  HENT:      Head: Normocephalic and atraumatic.      Mouth/Throat:      Mouth: Mucous membranes are moist.   Eyes:      Extraocular Movements: Extraocular movements intact.      Pupils: Pupils are equal, round, and reactive to light.   Neck:      Vascular: No carotid bruit or JVD.   Cardiovascular:      Rate and Rhythm: Normal rate and regular rhythm.      Heart sounds: No murmur heard.     No friction rub. No gallop.   Pulmonary:      Effort: Pulmonary effort is normal.      Breath sounds: Normal breath sounds.   Abdominal:      Tenderness: There is no abdominal tenderness. There is no guarding or rebound.   Musculoskeletal:      Right lower leg: No edema.      Left lower leg: No edema.   Skin:     General: Skin is warm and dry.      Capillary Refill: Capillary refill takes less than 2 seconds.   Neurological:      General: No focal deficit present.      Mental Status: She is alert.   Psychiatric:         Mood and Affect: Mood normal.         Labs  Lab Visit on 05/03/2023   Component Date Value Ref Range Status    WBC 05/03/2023 6.54  3.90 - 12.70 K/uL Final    RBC 05/03/2023 4.53  4.00 - 5.40 M/uL Final     Hemoglobin 05/03/2023 13.4  12.0 - 16.0 g/dL Final    Hematocrit 05/03/2023 43.6  37.0 - 48.5 % Final    MCV 05/03/2023 96  82 - 98 fL Final    MCH 05/03/2023 29.6  27.0 - 31.0 pg Final    MCHC 05/03/2023 30.7 (L)  32.0 - 36.0 g/dL Final    RDW 05/03/2023 14.5  11.5 - 14.5 % Final    Platelets 05/03/2023 225  150 - 450 K/uL Final    MPV 05/03/2023 10.6  9.2 - 12.9 fL Final    Immature Granulocytes 05/03/2023 0.2  0.0 - 0.5 % Final    Gran # (ANC) 05/03/2023 4.7  1.8 - 7.7 K/uL Final    Immature Grans (Abs) 05/03/2023 0.01  0.00 - 0.04 K/uL Final    Comment: Mild elevation in immature granulocytes is non specific and   can be seen in a variety of conditions including stress response,   acute inflammation, trauma and pregnancy. Correlation with other   laboratory and clinical findings is essential.      Lymph # 05/03/2023 1.0  1.0 - 4.8 K/uL Final    Mono # 05/03/2023 0.5  0.3 - 1.0 K/uL Final    Eos # 05/03/2023 0.3  0.0 - 0.5 K/uL Final    Baso # 05/03/2023 0.07  0.00 - 0.20 K/uL Final    nRBC 05/03/2023 0  0 /100 WBC Final    Gran % 05/03/2023 71.7  38.0 - 73.0 % Final    Lymph % 05/03/2023 14.8 (L)  18.0 - 48.0 % Final    Mono % 05/03/2023 7.2  4.0 - 15.0 % Final    Eosinophil % 05/03/2023 5.0  0.0 - 8.0 % Final    Basophil % 05/03/2023 1.1  0.0 - 1.9 % Final    Differential Method 05/03/2023 Automated   Final    Sodium 05/03/2023 142  136 - 145 mmol/L Final    Potassium 05/03/2023 4.2  3.5 - 5.1 mmol/L Final    Chloride 05/03/2023 103  95 - 110 mmol/L Final    CO2 05/03/2023 29  23 - 29 mmol/L Final    Glucose 05/03/2023 114 (H)  70 - 110 mg/dL Final    BUN 05/03/2023 16  8 - 23 mg/dL Final    Creatinine 05/03/2023 0.7  0.5 - 1.4 mg/dL Final    Calcium 05/03/2023 9.8  8.7 - 10.5 mg/dL Final    Total Protein 05/03/2023 7.4  6.0 - 8.4 g/dL Final    Albumin 05/03/2023 4.0  3.5 - 5.2 g/dL Final    Total Bilirubin 05/03/2023 0.5  0.1 - 1.0 mg/dL Final    Comment: For infants and newborns, interpretation of results  should be based  on gestational age, weight and in agreement with clinical  observations.    Premature Infant recommended reference ranges:  Up to 24 hours.............<8.0 mg/dL  Up to 48 hours............<12.0 mg/dL  3-5 days..................<15.0 mg/dL  6-29 days.................<15.0 mg/dL      Alkaline Phosphatase 05/03/2023 90  55 - 135 U/L Final    AST 05/03/2023 19  10 - 40 U/L Final    ALT 05/03/2023 12  10 - 44 U/L Final    Anion Gap 05/03/2023 10  8 - 16 mmol/L Final    eGFR 05/03/2023 >60.0  >60 mL/min/1.73 m^2 Final    Cholesterol 05/03/2023 145  120 - 199 mg/dL Final    Comment: The National Cholesterol Education Program (NCEP) has set the  following guidelines (reference ranges) for Cholesterol:  Optimal.....................<200 mg/dL  Borderline High.............200-239 mg/dL  High........................> or = 240 mg/dL      Triglycerides 05/03/2023 101  30 - 150 mg/dL Final    Comment: The National Cholesterol Education Program (NCEP) has set the  following guidelines (reference values) for triglycerides:  Normal......................<150 mg/dL  Borderline High.............150-199 mg/dL  High........................200-499 mg/dL      HDL 05/03/2023 52  40 - 75 mg/dL Final    Comment: The National Cholesterol Education Program (NCEP) has set the  following guidelines (reference values) for HDL Cholesterol:  Low...............<40 mg/dL  Optimal...........>60 mg/dL      LDL Cholesterol 05/03/2023 72.8  63.0 - 159.0 mg/dL Final    Comment: The National Cholesterol Education Program (NCEP) has set the  following guidelines (reference values) for LDL Cholesterol:  Optimal.......................<130 mg/dL  Borderline High...............130-159 mg/dL  High..........................160-189 mg/dL  Very High.....................>190 mg/dL      HDL/Cholesterol Ratio 05/03/2023 35.9  20.0 - 50.0 % Final    Total Cholesterol/HDL Ratio 05/03/2023 2.8  2.0 - 5.0 Final    Non-HDL Cholesterol 05/03/2023 93  mg/dL  Final    Comment: Risk category and Non-HDL cholesterol goals:  Coronary heart disease (CHD)or equivalent (10-year risk of CHD >20%):  Non-HDL cholesterol goal     <130 mg/dL  Two or more CHD risk factors and 10-year risk of CHD <= 20%:  Non-HDL cholesterol goal     <160 mg/dL  0 to 1 CHD risk factor:  Non-HDL cholesterol goal     <190 mg/dL      Hemoglobin A1C 05/03/2023 6.0 (H)  4.0 - 5.6 % Final    Comment: ADA Screening Guidelines:  5.7-6.4%  Consistent with prediabetes  >or=6.5%  Consistent with diabetes    High levels of fetal hemoglobin interfere with the HbA1C  assay. Heterozygous hemoglobin variants (HbS, HgC, etc)do  not significantly interfere with this assay.   However, presence of multiple variants may affect accuracy.      Estimated Avg Glucose 05/03/2023 126  68 - 131 mg/dL Final    TSH 05/03/2023 0.828  0.400 - 4.000 uIU/mL Final       EKG  03/21/2023 Normal sinus rhythm normal rate borderline abnormal precordial R-wave progression nonspecific ST-T changes  09/22/2023 normal sinus rhythm PAC borderline abnormal precordial R-wave progression subtle nonspecific ST-T changes baseline artifact rightward axis    Echo   Results for orders placed or performed during the hospital encounter of 01/01/23   Echo   Result Value Ref Range    BSA 1.59 m2    TDI SEPTAL 0.05 m/s    LV LATERAL E/E' RATIO 15.83 m/s    LV SEPTAL E/E' RATIO 19.00 m/s    IVC diameter 1.82 cm    Left Ventricular Outflow Tract Mean Velocity 0.52 cm/s    Left Ventricular Outflow Tract Mean Gradient 1.22 mmHg    AORTIC VALVE CUSP SEPERATION 0.96 cm    TDI LATERAL 0.06 m/s    PV PEAK VELOCITY 0.81 cm/s    LVIDd 4.17 3.5 - 6.0 cm    IVS 0.79 0.6 - 1.1 cm    Posterior Wall 1.09 0.6 - 1.1 cm    Ao root annulus 2.46 cm    LVIDs 3.10 2.1 - 4.0 cm    FS 26 28 - 44 %    Sinus 2.67 cm    STJ 2.30 cm    Ascending aorta 2.71 cm    LV mass 124.51 g    LA size 3.89 cm    RVDD 2.05 cm    Left Ventricle Relative Wall Thickness 0.52 cm    AV regurgitation  pressure 1/2 time [id number] ms    AV mean gradient 7 mmHg    AV valve area 1.42 cm2    AV Velocity Ratio 0.42     AV index (prosthetic) 0.46     MV valve area p 1/2 method 4.04 cm2    E/A ratio 1.79     Mean e' 0.06 m/s    E wave deceleration time 147.56 msec    LVOT diameter 1.99 cm    LVOT area 3.1 cm2    LVOT peak olegario 0.77 m/s    LVOT peak VTI 18.70 cm    Ao peak olegario 1.82 m/s    Ao VTI 41.0 cm    Mr max olegario 4.98 m/s    LVOT stroke volume 58.13 cm3    AV peak gradient 13 mmHg    E/E' ratio 17.27 m/s    MV Peak E Olegario 0.95 m/s    AR Max Olegario 3.89 m/s    TR Max Olegario 3.54 m/s    MV stenosis pressure 1/2 time 54.43 ms    MV Peak A Olegario 0.53 m/s    LV Systolic Volume 38.04 mL    LV Systolic Volume Index 23.9 mL/m2    LV Diastolic Volume 77.17 mL    LV Diastolic Volume Index 48.53 mL/m2    LV Mass Index 78 g/m2    RA Major Axis 3.96 cm    Left Atrium Minor Axis 3.97 cm    Left Atrium Major Axis 5.41 cm    Triscuspid Valve Regurgitation Peak Gradient 50 mmHg    LA Volume Index (Mod) 37.2 mL/m2    LA volume (mod) 59.19 cm3    RA Width 2.82 cm    Right Atrial Pressure (from IVC) 8 mmHg    EF 55 %    TV resting pulmonary artery pressure 58 mmHg    Narrative    · The left ventricle is normal in size with mild concentric hypertrophy   and normal systolic function.  · The estimated ejection fraction is 55%.  · Grade II left ventricular diastolic dysfunction.  · Mild left atrial enlargement.  · Moderate aortic regurgitation.  · Mild mitral regurgitation.  · Normal right ventricular size with normal right ventricular systolic   function.  · Mild to moderate tricuspid regurgitation.  · There is pulmonary hypertension.  · The estimated PA systolic pressure is 58 mmHg.  · Intermediate central venous pressure (8 mmHg).          Imaging  No results found.    Prior coronary angiogram / intervention:  See HPI    Assessment and Plan  1. Atherosclerosis of native coronary artery of native heart without angina pectoris  Continue  anticoagulation with adjusted dose Eliquis, statin, beta-blocker  Okay to hold aspirin while on anticoagulation    2. S/P CABG (coronary artery bypass graft)  As above    3.  Paroxysmal atrial fibrillation  Continue adjusted dose Eliquis in addition to diltiazem and metoprolol  Normal sinus rhythm today EKG interpretation as above    4. Chronic heart failure with preserved ejection fraction  Euvolemic off diuretics    5. Hypertension, unspecified type  Calcium channel blocker, beta-blocker  Home blood pressure log    6. Hyperlipidemia, unspecified hyperlipidemia type  Continue statin could consider Crestor 20 as opposed to 10    7. Stenosis of carotid artery, unspecified laterality  Need records  Consider ultrasound next visit if unable to find     8. Peripheral artery disease  Continue anticoagulation with statin    Follow Up  Six months    Total professional time spent for the encounter: 40 minutes  Time was spent preparing to see the patient, reviewing results of prior testing, obtaining and/or reviewing separately obtained history, performing a medically appropriate examination and interview, counseling and educating the patient/family, ordering medications/tests/procedures, referring and communicating with other health care professionals, documenting clinical information in the electronic health record, and independently interpreting results.     Pawel Gutierrez MD, FACC, RPVI  Interventional Cardiology

## 2023-09-25 ENCOUNTER — TELEPHONE (OUTPATIENT)
Dept: PRIMARY CARE CLINIC | Facility: CLINIC | Age: 83
End: 2023-09-25
Payer: MEDICARE

## 2023-09-25 NOTE — TELEPHONE ENCOUNTER
----- Message from Virginie Johnson sent at 9/25/2023 10:04 AM CDT -----  Contact: [phone]  PT is requesting a prescription for something for nausea. Pt is using   [facility] - [facility] [address] [facility] [address] at [facility] [address]  Phone: [phone] Fax: [phone]    Per pt, if this can be done today would be great.           Thank you

## 2023-09-27 ENCOUNTER — TELEPHONE (OUTPATIENT)
Dept: PRIMARY CARE CLINIC | Facility: CLINIC | Age: 83
End: 2023-09-27
Payer: MEDICARE

## 2023-09-27 DIAGNOSIS — R11.0 NAUSEA: Primary | ICD-10-CM

## 2023-09-27 RX ORDER — ONDANSETRON 4 MG/1
TABLET, FILM COATED ORAL
Qty: 12 TABLET | Refills: 11 | Status: SHIPPED | OUTPATIENT
Start: 2023-09-27

## 2023-09-27 NOTE — TELEPHONE ENCOUNTER
----- Message from Margarita Ramirez sent at 9/27/2023  8:48 AM CDT -----  Contact: [phone]  .1 Patient would like to get medical advice.  Symptoms (please be specific): nausea  How long has patient had these symptoms: 4 days ago  Pharmacy name and phone#:[facility] - [place], [place] - [address] [facility]  Phone:  [phone]  Fax:  [phone]  Any drug allergies: on file  Comments: Patient would like to get medical advice. Would like to be call nausea pills. Thank you.

## 2023-10-18 ENCOUNTER — PATIENT MESSAGE (OUTPATIENT)
Dept: CARDIOLOGY | Facility: CLINIC | Age: 83
End: 2023-10-18
Payer: MEDICARE

## 2023-11-28 ENCOUNTER — OFFICE VISIT (OUTPATIENT)
Dept: PRIMARY CARE CLINIC | Facility: CLINIC | Age: 83
End: 2023-11-28
Payer: MEDICARE

## 2023-11-28 VITALS
OXYGEN SATURATION: 95 % | RESPIRATION RATE: 16 BRPM | WEIGHT: 105 LBS | HEART RATE: 72 BPM | BODY MASS INDEX: 18.61 KG/M2 | TEMPERATURE: 98 F | SYSTOLIC BLOOD PRESSURE: 104 MMHG | DIASTOLIC BLOOD PRESSURE: 50 MMHG | HEIGHT: 63 IN

## 2023-11-28 DIAGNOSIS — G72.0 STATIN MYOPATHY: ICD-10-CM

## 2023-11-28 DIAGNOSIS — E74.819 DISORDERS OF GLUCOSE TRANSPORT, UNSPECIFIED: ICD-10-CM

## 2023-11-28 DIAGNOSIS — J43.8 OTHER EMPHYSEMA: ICD-10-CM

## 2023-11-28 DIAGNOSIS — T46.6X5A STATIN MYOPATHY: ICD-10-CM

## 2023-11-28 DIAGNOSIS — I50.32 CHRONIC HEART FAILURE WITH PRESERVED EJECTION FRACTION: Primary | ICD-10-CM

## 2023-11-28 DIAGNOSIS — Z95.1 S/P CABG (CORONARY ARTERY BYPASS GRAFT): ICD-10-CM

## 2023-11-28 DIAGNOSIS — J44.1 COPD EXACERBATION: ICD-10-CM

## 2023-11-28 PROCEDURE — 3078F PR MOST RECENT DIASTOLIC BLOOD PRESSURE < 80 MM HG: ICD-10-PCS | Mod: HCNC,CPTII,S$GLB, | Performed by: STUDENT IN AN ORGANIZED HEALTH CARE EDUCATION/TRAINING PROGRAM

## 2023-11-28 PROCEDURE — 3074F SYST BP LT 130 MM HG: CPT | Mod: HCNC,CPTII,S$GLB, | Performed by: STUDENT IN AN ORGANIZED HEALTH CARE EDUCATION/TRAINING PROGRAM

## 2023-11-28 PROCEDURE — 1101F PR PT FALLS ASSESS DOC 0-1 FALLS W/OUT INJ PAST YR: ICD-10-PCS | Mod: HCNC,CPTII,S$GLB, | Performed by: STUDENT IN AN ORGANIZED HEALTH CARE EDUCATION/TRAINING PROGRAM

## 2023-11-28 PROCEDURE — 1159F MED LIST DOCD IN RCRD: CPT | Mod: HCNC,CPTII,S$GLB, | Performed by: STUDENT IN AN ORGANIZED HEALTH CARE EDUCATION/TRAINING PROGRAM

## 2023-11-28 PROCEDURE — 99999 PR PBB SHADOW E&M-EST. PATIENT-LVL IV: ICD-10-PCS | Mod: PBBFAC,HCNC,, | Performed by: STUDENT IN AN ORGANIZED HEALTH CARE EDUCATION/TRAINING PROGRAM

## 2023-11-28 PROCEDURE — 3078F DIAST BP <80 MM HG: CPT | Mod: HCNC,CPTII,S$GLB, | Performed by: STUDENT IN AN ORGANIZED HEALTH CARE EDUCATION/TRAINING PROGRAM

## 2023-11-28 PROCEDURE — 3074F PR MOST RECENT SYSTOLIC BLOOD PRESSURE < 130 MM HG: ICD-10-PCS | Mod: HCNC,CPTII,S$GLB, | Performed by: STUDENT IN AN ORGANIZED HEALTH CARE EDUCATION/TRAINING PROGRAM

## 2023-11-28 PROCEDURE — 1101F PT FALLS ASSESS-DOCD LE1/YR: CPT | Mod: HCNC,CPTII,S$GLB, | Performed by: STUDENT IN AN ORGANIZED HEALTH CARE EDUCATION/TRAINING PROGRAM

## 2023-11-28 PROCEDURE — 3288F PR FALLS RISK ASSESSMENT DOCUMENTED: ICD-10-PCS | Mod: HCNC,CPTII,S$GLB, | Performed by: STUDENT IN AN ORGANIZED HEALTH CARE EDUCATION/TRAINING PROGRAM

## 2023-11-28 PROCEDURE — 99214 OFFICE O/P EST MOD 30 MIN: CPT | Mod: HCNC,S$GLB,, | Performed by: STUDENT IN AN ORGANIZED HEALTH CARE EDUCATION/TRAINING PROGRAM

## 2023-11-28 PROCEDURE — 1159F PR MEDICATION LIST DOCUMENTED IN MEDICAL RECORD: ICD-10-PCS | Mod: HCNC,CPTII,S$GLB, | Performed by: STUDENT IN AN ORGANIZED HEALTH CARE EDUCATION/TRAINING PROGRAM

## 2023-11-28 PROCEDURE — 99214 PR OFFICE/OUTPT VISIT, EST, LEVL IV, 30-39 MIN: ICD-10-PCS | Mod: HCNC,S$GLB,, | Performed by: STUDENT IN AN ORGANIZED HEALTH CARE EDUCATION/TRAINING PROGRAM

## 2023-11-28 PROCEDURE — 99999 PR PBB SHADOW E&M-EST. PATIENT-LVL IV: CPT | Mod: PBBFAC,HCNC,, | Performed by: STUDENT IN AN ORGANIZED HEALTH CARE EDUCATION/TRAINING PROGRAM

## 2023-11-28 PROCEDURE — 3288F FALL RISK ASSESSMENT DOCD: CPT | Mod: HCNC,CPTII,S$GLB, | Performed by: STUDENT IN AN ORGANIZED HEALTH CARE EDUCATION/TRAINING PROGRAM

## 2023-11-28 PROCEDURE — 1126F AMNT PAIN NOTED NONE PRSNT: CPT | Mod: HCNC,CPTII,S$GLB, | Performed by: STUDENT IN AN ORGANIZED HEALTH CARE EDUCATION/TRAINING PROGRAM

## 2023-11-28 PROCEDURE — 1126F PR PAIN SEVERITY QUANTIFIED, NO PAIN PRESENT: ICD-10-PCS | Mod: HCNC,CPTII,S$GLB, | Performed by: STUDENT IN AN ORGANIZED HEALTH CARE EDUCATION/TRAINING PROGRAM

## 2023-11-28 RX ORDER — GLYCOPYRROLATE AND FORMOTEROL FUMARATE 9; 4.8 UG/1; UG/1
2 AEROSOL, METERED RESPIRATORY (INHALATION) 2 TIMES DAILY
Qty: 10.7 G | Refills: 5 | Status: SHIPPED | OUTPATIENT
Start: 2023-11-28

## 2023-11-28 RX ORDER — NITROGLYCERIN 0.4 MG/1
0.4 TABLET SUBLINGUAL EVERY 5 MIN PRN
Qty: 20 TABLET | Refills: 3 | Status: SHIPPED | OUTPATIENT
Start: 2023-11-28

## 2023-11-28 RX ORDER — IPRATROPIUM BROMIDE 0.5 MG/2.5ML
500 SOLUTION RESPIRATORY (INHALATION) EVERY 8 HOURS PRN
Qty: 150 ML | Refills: 5 | Status: SHIPPED | OUTPATIENT
Start: 2023-11-28

## 2023-11-28 RX ORDER — LEVALBUTEROL INHALATION SOLUTION 1.25 MG/3ML
1 SOLUTION RESPIRATORY (INHALATION) EVERY 8 HOURS PRN
Qty: 150 ML | Refills: 3 | Status: SHIPPED | OUTPATIENT
Start: 2023-11-28

## 2023-11-28 NOTE — PROGRESS NOTES
"Subjective:           Patient ID: Ann Marie Hirsch is a 83 y.o. female who presents today with a chief complaint of Follow-up (A-fib, emphysema & COPD)  .    Chief Complaint:   Follow-up (A-fib, emphysema & COPD)      History of Present Illness:    Ann Marie Hirsch is a 83 y.o. female who presents today with a chief complaint of Follow-up (A-fib, emphysema & COPD)  .    84yo female presenting for f/u today.  Presents with her 2nd daughter today.   States does not feel well at all.     Is on 2L via NC at this time.  Does feel SOB frequently.    Denies getting chest congestion frequently.  If does uses Nitroglycerine.  Used once last week.     Worse when laying back at night or with exertion.    Is taking the Bevespi BID.    Has a nebulizer and uses it every 8 hours.          Review of Systems   Constitutional:  Positive for fatigue and fever. Negative for activity change and unexpected weight change.   HENT:  Negative for congestion, nosebleeds, sinus pressure and sneezing.    Respiratory:  Positive for cough, shortness of breath and wheezing.    Cardiovascular:  Negative for chest pain, palpitations and leg swelling.   Gastrointestinal:  Negative for abdominal distention, constipation, diarrhea and nausea.   Genitourinary:  Negative for difficulty urinating and dysuria.   Musculoskeletal:  Negative for back pain and gait problem.   Skin:  Positive for rash. Negative for pallor.   Neurological:  Positive for dizziness and light-headedness. Negative for weakness, numbness and headaches.   Psychiatric/Behavioral:  Negative for agitation. The patient is not nervous/anxious.            Objective:        Vitals:    11/28/23 1321   BP: (!) 104/50   BP Location: Right arm   Patient Position: Sitting   BP Method: Medium (Manual)   Pulse: 72   Resp: 16   Temp: 97.7 °F (36.5 °C)   TempSrc: Temporal   SpO2: 95%   Weight: 47.6 kg (105 lb)   Height: 5' 3" (1.6 m)       Body mass index is 18.6 kg/m².      Physical " Exam  Constitutional:       General: She is not in acute distress.     Appearance: Normal appearance. She is ill-appearing.   HENT:      Head: Normocephalic and atraumatic.      Right Ear: External ear normal.      Left Ear: External ear normal.      Nose: No rhinorrhea.      Mouth/Throat:      Mouth: Mucous membranes are moist.      Pharynx: No posterior oropharyngeal erythema.   Eyes:      Extraocular Movements: Extraocular movements intact.      Conjunctiva/sclera: Conjunctivae normal.   Cardiovascular:      Rate and Rhythm: Normal rate and regular rhythm.      Heart sounds: No murmur heard.  Pulmonary:      Effort: No respiratory distress.      Breath sounds: Wheezing present.      Comments: Increased work of breathing, using 2 L via nasal cannula.        Diminished lung sounds on back.  Abdominal:      General: Abdomen is flat.   Musculoskeletal:      Right lower leg: No edema.      Left lower leg: No edema.   Skin:     Capillary Refill: Capillary refill takes less than 2 seconds.      Coloration: Skin is not jaundiced.      Findings: No bruising.   Neurological:      General: No focal deficit present.      Mental Status: She is alert and oriented to person, place, and time.      Motor: No weakness.      Gait: Gait normal.   Psychiatric:         Mood and Affect: Mood normal.             Lab Results   Component Value Date     05/03/2023    K 4.2 05/03/2023     05/03/2023    CO2 29 05/03/2023    BUN 16 05/03/2023    CREATININE 0.7 05/03/2023    ANIONGAP 10 05/03/2023     Lab Results   Component Value Date    HGBA1C 6.0 (H) 05/03/2023     Lab Results   Component Value Date     (H) 02/05/2023     (H) 01/30/2023     (H) 01/01/2023       Lab Results   Component Value Date    WBC 6.54 05/03/2023    HGB 13.4 05/03/2023    HCT 43.6 05/03/2023    HCT 33 (L) 02/05/2023     05/03/2023    GRAN 4.7 05/03/2023    GRAN 71.7 05/03/2023     Lab Results   Component Value Date    CHOL 145  05/03/2023    HDL 52 05/03/2023    LDLCALC 72.8 05/03/2023    TRIG 101 05/03/2023          Current Outpatient Medications:     apixaban (ELIQUIS) 2.5 mg Tab, Take 1 tablet (2.5 mg total) by mouth 2 (two) times daily., Disp: 180 tablet, Rfl: 2    diltiaZEM (CARDIZEM CD) 120 MG Cp24, Take 1 capsule (120 mg total) by mouth once daily., Disp: 90 capsule, Rfl: 3    metoprolol succinate (TOPROL-XL) 50 MG 24 hr tablet, Take 1 tablet (50 mg total) by mouth once daily., Disp: 30 tablet, Rfl: 5    omeprazole (PRILOSEC) 20 MG capsule, Take 1 capsule (20 mg total) by mouth once daily., Disp: 90 capsule, Rfl: 3    ondansetron (ZOFRAN) 4 MG tablet, Use 1 tab by mouth up to 3 times per week.  12 tabs for 1 month supply., Disp: 12 tablet, Rfl: 11    glycopyrrolate-formoteroL (BEVESPI AEROSPHERE) 9-4.8 mcg HFAA, Inhale 2 puffs into the lungs 2 (two) times a day. Controller, Disp: 10.7 g, Rfl: 5    ipratropium (ATROVENT) 0.02 % nebulizer solution, Take 2.5 mLs (500 mcg total) by nebulization every 8 (eight) hours as needed for Wheezing., Disp: 150 mL, Rfl: 5    levalbuterol (XOPENEX) 1.25 mg/3 mL nebulizer solution, Take 3 mLs (1.25 mg total) by nebulization every 8 (eight) hours as needed for Wheezing or Shortness of Breath., Disp: 150 mL, Rfl: 3    nitroGLYCERIN (NITROSTAT) 0.4 MG SL tablet, Place 1 tablet (0.4 mg total) under the tongue every 5 (five) minutes as needed for Chest pain., Disp: 20 tablet, Rfl: 3     Outpatient Encounter Medications as of 11/28/2023   Medication Sig Dispense Refill    apixaban (ELIQUIS) 2.5 mg Tab Take 1 tablet (2.5 mg total) by mouth 2 (two) times daily. 180 tablet 2    diltiaZEM (CARDIZEM CD) 120 MG Cp24 Take 1 capsule (120 mg total) by mouth once daily. 90 capsule 3    metoprolol succinate (TOPROL-XL) 50 MG 24 hr tablet Take 1 tablet (50 mg total) by mouth once daily. 30 tablet 5    omeprazole (PRILOSEC) 20 MG capsule Take 1 capsule (20 mg total) by mouth once daily. 90 capsule 3    ondansetron  (ZOFRAN) 4 MG tablet Use 1 tab by mouth up to 3 times per week.  12 tabs for 1 month supply. 12 tablet 11    [DISCONTINUED] glycopyrrolate-formoteroL (BEVESPI AEROSPHERE) 9-4.8 mcg HFAA Inhale 2 puffs into the lungs 2 (two) times a day. Controller 10.7 g 2    [DISCONTINUED] ipratropium (ATROVENT) 0.02 % nebulizer solution Take 2.5 mLs (500 mcg total) by nebulization every 8 (eight) hours as needed for Wheezing. 75 mL 3    [DISCONTINUED] levalbuterol (XOPENEX) 1.25 mg/3 mL nebulizer solution Take 3 mLs (1.25 mg total) by nebulization every 8 (eight) hours as needed for Wheezing or Shortness of Breath. 75 mL 2    [DISCONTINUED] nitroGLYCERIN (NITROSTAT) 0.4 MG SL tablet PLACE 1 TAB UNDER TONGUE FOR CHEST PAIN EVERY 5 MINUTES UP TO 3 DOSES GO TO ER IF PAIN NOT RELIEVED  3    [DISCONTINUED] rosuvastatin (CRESTOR) 10 MG tablet TAKE ONE TABLET BY MOUTH ONCE DAILY 90 tablet 3    glycopyrrolate-formoteroL (BEVESPI AEROSPHERE) 9-4.8 mcg HFAA Inhale 2 puffs into the lungs 2 (two) times a day. Controller 10.7 g 5    ipratropium (ATROVENT) 0.02 % nebulizer solution Take 2.5 mLs (500 mcg total) by nebulization every 8 (eight) hours as needed for Wheezing. 150 mL 5    levalbuterol (XOPENEX) 1.25 mg/3 mL nebulizer solution Take 3 mLs (1.25 mg total) by nebulization every 8 (eight) hours as needed for Wheezing or Shortness of Breath. 150 mL 3    nitroGLYCERIN (NITROSTAT) 0.4 MG SL tablet Place 1 tablet (0.4 mg total) under the tongue every 5 (five) minutes as needed for Chest pain. 20 tablet 3     No facility-administered encounter medications on file as of 11/28/2023.          Assessment:       1. Chronic heart failure with preserved ejection fraction    2. COPD exacerbation    3. Other emphysema    4. S/P CABG (coronary artery bypass graft)    5. Disorders of glucose transport, unspecified           Plan:       Chronic heart failure with preserved ejection fraction  -     nitroGLYCERIN (NITROSTAT) 0.4 MG SL tablet; Place 1 tablet  (0.4 mg total) under the tongue every 5 (five) minutes as needed for Chest pain.  Dispense: 20 tablet; Refill: 3  -     CBC Auto Differential; Future; Expected date: 11/28/2023  -     Comprehensive Metabolic Panel; Future; Expected date: 11/28/2023  -     Lipid Panel; Future; Expected date: 11/28/2023  -     Hemoglobin A1C; Future; Expected date: 11/28/2023  -     TSH; Future; Expected date: 11/28/2023    COPD exacerbation  -     glycopyrrolate-formoteroL (BEVESPI AEROSPHERE) 9-4.8 mcg HFAA; Inhale 2 puffs into the lungs 2 (two) times a day. Controller  Dispense: 10.7 g; Refill: 5  -     ipratropium (ATROVENT) 0.02 % nebulizer solution; Take 2.5 mLs (500 mcg total) by nebulization every 8 (eight) hours as needed for Wheezing.  Dispense: 150 mL; Refill: 5  -     levalbuterol (XOPENEX) 1.25 mg/3 mL nebulizer solution; Take 3 mLs (1.25 mg total) by nebulization every 8 (eight) hours as needed for Wheezing or Shortness of Breath.  Dispense: 150 mL; Refill: 3  -     CBC Auto Differential; Future; Expected date: 11/28/2023  -     Comprehensive Metabolic Panel; Future; Expected date: 11/28/2023  -     Lipid Panel; Future; Expected date: 11/28/2023  -     Hemoglobin A1C; Future; Expected date: 11/28/2023  -     TSH; Future; Expected date: 11/28/2023    Other emphysema  -     glycopyrrolate-formoteroL (BEVESPI AEROSPHERE) 9-4.8 mcg HFAA; Inhale 2 puffs into the lungs 2 (two) times a day. Controller  Dispense: 10.7 g; Refill: 5  -     ipratropium (ATROVENT) 0.02 % nebulizer solution; Take 2.5 mLs (500 mcg total) by nebulization every 8 (eight) hours as needed for Wheezing.  Dispense: 150 mL; Refill: 5  -     levalbuterol (XOPENEX) 1.25 mg/3 mL nebulizer solution; Take 3 mLs (1.25 mg total) by nebulization every 8 (eight) hours as needed for Wheezing or Shortness of Breath.  Dispense: 150 mL; Refill: 3    S/P CABG (coronary artery bypass graft)  -     nitroGLYCERIN (NITROSTAT) 0.4 MG SL tablet; Place 1 tablet (0.4 mg total)  under the tongue every 5 (five) minutes as needed for Chest pain.  Dispense: 20 tablet; Refill: 3    Disorders of glucose transport, unspecified  -     Hemoglobin A1C; Future; Expected date: 11/28/2023               Chronic heart failure with preserved ejection fraction  -     nitroGLYCERIN (NITROSTAT) 0.4 MG SL tablet; Place 1 tablet (0.4 mg total) under the tongue every 5 (five) minutes as needed for Chest pain.  Dispense: 20 tablet; Refill: 3   - using nitro at times, advise continued monitoring with cardiology.    Statin Myopathy:   - patient is 83, taking statin, but gets some leg pains.    - possibly from med, so will stop med at this time.     COPD exacerbation  -     glycopyrrolate-formoteroL (BEVESPI AEROSPHERE) 9-4.8 mcg HFAA; Inhale 2 puffs into the lungs 2 (two) times a day. Controller  Dispense: 10.7 g; Refill: 5  -     ipratropium (ATROVENT) 0.02 % nebulizer solution; Take 2.5 mLs (500 mcg total) by nebulization every 8 (eight) hours as needed for Wheezing.  Dispense: 150 mL; Refill: 5  -     levalbuterol (XOPENEX) 1.25 mg/3 mL nebulizer solution; Take 3 mLs (1.25 mg total) by nebulization every 8 (eight) hours as needed for Wheezing or Shortness of Breath.  Dispense: 150 mL; Refill: 3   - refilling meds.   - advised speaking with pulm due to continued need for O2 and LABA/LAMA, did not tolerate Trelegy.     - taking rescue nebulizer TID at this time, was advised this was not ideal.  Will meed with pulm to discuss other options.     Other emphysema  -     glycopyrrolate-formoteroL (BEVESPI AEROSPHERE) 9-4.8 mcg HFAA; Inhale 2 puffs into the lungs 2 (two) times a day. Controller  Dispense: 10.7 g; Refill: 5  -     ipratropium (ATROVENT) 0.02 % nebulizer solution; Take 2.5 mLs (500 mcg total) by nebulization every 8 (eight) hours as needed for Wheezing.  Dispense: 150 mL; Refill: 5  -     levalbuterol (XOPENEX) 1.25 mg/3 mL nebulizer solution; Take 3 mLs (1.25 mg total) by nebulization every 8 (eight)  hours as needed for Wheezing or Shortness of Breath.  Dispense: 150 mL; Refill: 3    S/P CABG (coronary artery bypass graft)  -     nitroGLYCERIN (NITROSTAT) 0.4 MG SL tablet; Place 1 tablet (0.4 mg total) under the tongue every 5 (five) minutes as needed for Chest pain.  Dispense: 20 tablet; Refill: 3    Will stop statin at this time.   Can recheck Lipids in May and restart if feel is indicated.

## 2023-11-28 NOTE — PATIENT INSTRUCTIONS
Chronic heart failure with preserved ejection fraction  -     nitroGLYCERIN (NITROSTAT) 0.4 MG SL tablet; Place 1 tablet (0.4 mg total) under the tongue every 5 (five) minutes as needed for Chest pain.  Dispense: 20 tablet; Refill: 3   - using nitro at times, advise continued monitoring with cardiology.    Statin Myopathy:   - patient is 83, taking statin, but gets some leg pains.    - possibly from med, so will stop med at this time.     COPD exacerbation  -     glycopyrrolate-formoteroL (BEVESPI AEROSPHERE) 9-4.8 mcg HFAA; Inhale 2 puffs into the lungs 2 (two) times a day. Controller  Dispense: 10.7 g; Refill: 5  -     ipratropium (ATROVENT) 0.02 % nebulizer solution; Take 2.5 mLs (500 mcg total) by nebulization every 8 (eight) hours as needed for Wheezing.  Dispense: 150 mL; Refill: 5  -     levalbuterol (XOPENEX) 1.25 mg/3 mL nebulizer solution; Take 3 mLs (1.25 mg total) by nebulization every 8 (eight) hours as needed for Wheezing or Shortness of Breath.  Dispense: 150 mL; Refill: 3   - refilling meds.   - advised speaking with pulm due to continued need for O2 and LABA/LAMA, did not tolerate Trelegy.     - taking rescue nebulizer TID at this time, was advised this was not ideal.  Will meed with pulm to discuss other options.     Other emphysema  -     glycopyrrolate-formoteroL (BEVESPI AEROSPHERE) 9-4.8 mcg HFAA; Inhale 2 puffs into the lungs 2 (two) times a day. Controller  Dispense: 10.7 g; Refill: 5  -     ipratropium (ATROVENT) 0.02 % nebulizer solution; Take 2.5 mLs (500 mcg total) by nebulization every 8 (eight) hours as needed for Wheezing.  Dispense: 150 mL; Refill: 5  -     levalbuterol (XOPENEX) 1.25 mg/3 mL nebulizer solution; Take 3 mLs (1.25 mg total) by nebulization every 8 (eight) hours as needed for Wheezing or Shortness of Breath.  Dispense: 150 mL; Refill: 3    S/P CABG (coronary artery bypass graft)  -     nitroGLYCERIN (NITROSTAT) 0.4 MG SL tablet; Place 1 tablet (0.4 mg total) under the  tongue every 5 (five) minutes as needed for Chest pain.  Dispense: 20 tablet; Refill: 3    Will stop statin at this time.   Can recheck Lipids in May and restart if feel is indicated.

## 2023-12-14 ENCOUNTER — PATIENT OUTREACH (OUTPATIENT)
Dept: ADMINISTRATIVE | Facility: HOSPITAL | Age: 83
End: 2023-12-14
Payer: MEDICARE

## 2023-12-14 NOTE — PROGRESS NOTES
Health Maintenance Due   Topic Date Due    COVID-19 Vaccine (1) Never done    DEXA Scan  Never done    RSV Vaccine (Age 60+ and Pregnant patients) (1 - 1-dose 60+ series) Never done    Shingles Vaccine (2 of 3) 09/12/2018    Influenza Vaccine (1) Never done     Immunizations - reviewed and updated   Care Everywhere - [facility]   Care Teams - updated   Outreach - [place] Gap Report 2023 reviewed, statin therapy for patient reviewed. Patient was taking Rosuvastatin, but it was noted on 11/28/2023 that the patient was c/o leg pains.   Statin myopathy added to problem list and also added to allergy list.

## 2024-01-19 DIAGNOSIS — I48.19 PERSISTENT ATRIAL FIBRILLATION: ICD-10-CM

## 2024-01-19 DIAGNOSIS — K21.9 GASTROESOPHAGEAL REFLUX DISEASE, UNSPECIFIED WHETHER ESOPHAGITIS PRESENT: ICD-10-CM

## 2024-01-19 RX ORDER — OMEPRAZOLE 20 MG/1
20 CAPSULE, DELAYED RELEASE ORAL DAILY
Qty: 90 CAPSULE | Refills: 3 | Status: SHIPPED | OUTPATIENT
Start: 2024-01-19 | End: 2024-06-10

## 2024-01-19 NOTE — TELEPHONE ENCOUNTER
Refill Routing Note   Medication(s) are not appropriate for processing by [facility] for the following reason(s):        Drug-disease interaction    [facility] action(s):  Defer  Approve        Medication Therapy Plan: Drug-Disease: metoprolol succinate and Peripheral artery disease; DDI: metoprolol and acute diastolic heart failure    Pharmacist review requested: Yes     Appointments  past 12m or future 3m with PCP    Date Provider   Last Visit   11/28/2023 Estevan Ruiz MD   Next Visit   Visit date not found Estevan Ruiz MD   ED visits in past 90 days: 0        Note composed:1:53 PM 01/19/2024

## 2024-01-19 NOTE — TELEPHONE ENCOUNTER
No care due was identified.  [facility] Embedded Care Due Messages. Reference number: [id number].   1/19/2024 9:39:55 AM CST

## 2024-01-20 RX ORDER — METOPROLOL SUCCINATE 50 MG/1
50 TABLET, EXTENDED RELEASE ORAL
Qty: 90 TABLET | Refills: 3 | Status: SHIPPED | OUTPATIENT
Start: 2024-01-20

## 2024-01-20 NOTE — TELEPHONE ENCOUNTER
Refill Decision Note   Ann Marie Hirsch  is requesting a refill authorization.  Brief Assessment and Rationale for Refill:  Approve     Medication Therapy Plan:         Pharmacist review requested: Yes   Comments:     Note composed:7:08 AM 01/20/2024

## 2024-01-30 ENCOUNTER — TELEPHONE (OUTPATIENT)
Dept: CARDIOLOGY | Facility: CLINIC | Age: 84
End: 2024-01-30
Payer: MEDICARE

## 2024-01-30 NOTE — TELEPHONE ENCOUNTER
Spoke with patient, scheduled carotid ultrasound for 02/08/2024 and follow up appointment with provider for 03/13/2024.  Patient verbalized understanding.

## 2024-01-30 NOTE — TELEPHONE ENCOUNTER
----- Message from Cathleen Beatty sent at 1/30/2024  1:02 PM CST -----  Regarding: order needed for carotid US  Contact: PT  [phone]  The patient called requesting to schedule from recall. Needs orders placed for carotid US and scheduled w/appt w/ Dr. Gutierrez to follow. PT does request the appts be after 12 both appts for pt transportation. Please contact patient     No further information provided      Patient can be contacted @# [phone]

## 2024-02-08 DIAGNOSIS — Z78.0 MENOPAUSE: ICD-10-CM

## 2024-02-09 ENCOUNTER — TELEPHONE (OUTPATIENT)
Dept: CARDIOLOGY | Facility: CLINIC | Age: 84
End: 2024-02-09
Payer: MEDICARE

## 2024-02-09 NOTE — TELEPHONE ENCOUNTER
----- Message from Pawel Gutierrez MD sent at 2/8/2024  9:12 PM CST -----  Please let patient know there are no carotid blockages  ----- Message -----  From: Interface, Rad Results In  Sent: 2/8/2024   4:29 PM CST  To: Pawel Gutierrez MD

## 2024-06-10 ENCOUNTER — OFFICE VISIT (OUTPATIENT)
Dept: PRIMARY CARE CLINIC | Facility: CLINIC | Age: 84
End: 2024-06-10
Payer: MEDICARE

## 2024-06-10 VITALS
RESPIRATION RATE: 19 BRPM | DIASTOLIC BLOOD PRESSURE: 60 MMHG | HEIGHT: 63 IN | WEIGHT: 108.13 LBS | HEART RATE: 64 BPM | SYSTOLIC BLOOD PRESSURE: 118 MMHG | OXYGEN SATURATION: 97 % | BODY MASS INDEX: 19.16 KG/M2

## 2024-06-10 DIAGNOSIS — Z79.899 OTHER LONG TERM (CURRENT) DRUG THERAPY: ICD-10-CM

## 2024-06-10 DIAGNOSIS — K21.9 GASTROESOPHAGEAL REFLUX DISEASE, UNSPECIFIED WHETHER ESOPHAGITIS PRESENT: Primary | ICD-10-CM

## 2024-06-10 DIAGNOSIS — J44.1 COPD EXACERBATION: ICD-10-CM

## 2024-06-10 DIAGNOSIS — Z00.00 HEALTH CARE MAINTENANCE: ICD-10-CM

## 2024-06-10 DIAGNOSIS — Z95.1 S/P CABG (CORONARY ARTERY BYPASS GRAFT): ICD-10-CM

## 2024-06-10 DIAGNOSIS — J31.0 RHINITIS, UNSPECIFIED TYPE: ICD-10-CM

## 2024-06-10 PROCEDURE — 1126F AMNT PAIN NOTED NONE PRSNT: CPT | Mod: HCNC,CPTII,S$GLB, | Performed by: STUDENT IN AN ORGANIZED HEALTH CARE EDUCATION/TRAINING PROGRAM

## 2024-06-10 PROCEDURE — 99214 OFFICE O/P EST MOD 30 MIN: CPT | Mod: HCNC,S$GLB,, | Performed by: STUDENT IN AN ORGANIZED HEALTH CARE EDUCATION/TRAINING PROGRAM

## 2024-06-10 PROCEDURE — 3078F DIAST BP <80 MM HG: CPT | Mod: HCNC,CPTII,S$GLB, | Performed by: STUDENT IN AN ORGANIZED HEALTH CARE EDUCATION/TRAINING PROGRAM

## 2024-06-10 PROCEDURE — 3074F SYST BP LT 130 MM HG: CPT | Mod: HCNC,CPTII,S$GLB, | Performed by: STUDENT IN AN ORGANIZED HEALTH CARE EDUCATION/TRAINING PROGRAM

## 2024-06-10 PROCEDURE — 99999 PR PBB SHADOW E&M-EST. PATIENT-LVL IV: CPT | Mod: PBBFAC,HCNC,, | Performed by: STUDENT IN AN ORGANIZED HEALTH CARE EDUCATION/TRAINING PROGRAM

## 2024-06-10 PROCEDURE — 3288F FALL RISK ASSESSMENT DOCD: CPT | Mod: HCNC,CPTII,S$GLB, | Performed by: STUDENT IN AN ORGANIZED HEALTH CARE EDUCATION/TRAINING PROGRAM

## 2024-06-10 PROCEDURE — 1159F MED LIST DOCD IN RCRD: CPT | Mod: HCNC,CPTII,S$GLB, | Performed by: STUDENT IN AN ORGANIZED HEALTH CARE EDUCATION/TRAINING PROGRAM

## 2024-06-10 PROCEDURE — 1101F PT FALLS ASSESS-DOCD LE1/YR: CPT | Mod: HCNC,CPTII,S$GLB, | Performed by: STUDENT IN AN ORGANIZED HEALTH CARE EDUCATION/TRAINING PROGRAM

## 2024-06-10 PROCEDURE — 1160F RVW MEDS BY RX/DR IN RCRD: CPT | Mod: HCNC,CPTII,S$GLB, | Performed by: STUDENT IN AN ORGANIZED HEALTH CARE EDUCATION/TRAINING PROGRAM

## 2024-06-10 RX ORDER — SUCRALFATE 1 G/1
1 TABLET ORAL 2 TIMES DAILY PRN
Qty: 60 TABLET | Refills: 5 | Status: SHIPPED | OUTPATIENT
Start: 2024-06-10

## 2024-06-10 RX ORDER — OMEPRAZOLE 40 MG/1
40 CAPSULE, DELAYED RELEASE ORAL DAILY
Qty: 90 CAPSULE | Refills: 3 | Status: SHIPPED | OUTPATIENT
Start: 2024-06-10 | End: 2025-06-10

## 2024-06-10 NOTE — PATIENT INSTRUCTIONS
Gastroesophageal reflux disease, unspecified whether esophagitis present  -     omeprazole (PRILOSEC) 40 MG capsule; Take 1 capsule (40 mg total) by mouth once daily.  Dispense: 90 capsule; Refill: 3  -     sucralfate (CARAFATE) 1 gram tablet; Take 1 tablet (1 g total) by mouth 2 (two) times daily as needed (stomach pain, burning).  Dispense: 60 tablet; Refill: 5  -     CBC, CMP, A1c, lipid, TSH.  -   Patient having worsening GERD symptoms, history shows hiatal hernia.  -   Increasing PPI from 20 mg daily to 40 mg nightly.  Additionally adding Carafate 2 times daily.      Rhinitis, unspecified type  -     CBC, CMP, A1c, lipid, TSH.  -   Monitor.  -   Patient with issues getting adequate air movement through nostrils/nasal turbinates.  Would like to speak to ENT about possible turbinectomy, dilation or other approach to help improve nasal breathing.      S/P CABG (coronary artery bypass graft)   -   Continue to maintain healthy blood pressure and cholesterol.  Checking lipids.    COPD exacerbation   -   Using respiratory medications as needed.  Tolerating well.    Health care maintenance  -     CBC, CMP, A1c, lipid, TSH.  -         Other long term (current) drug therapy  -     CBC, CMP, A1c, lipid, TSH.  -

## 2024-06-10 NOTE — PROGRESS NOTES
"Subjective:           Patient ID: Ann Marie Hirsch   Age:  84 y.o.  Sex: female     Chief Complaint:   Follow-up (6 month )      History of Present Illness:    Ann Marie Hirsch is a 84 y.o. female who presents today with a chief complaint of Follow-up (6 month )  .    85yo female presenting for 6 month f/u appt, having some SOB despite use of O2 via NC.     States is using O2 vis NC at 3L.  Has been having issues at night getting SOB or having to mouth breath.    Wondering if nasal turbinates are to enlarged or swollen.  Can speak to ENT about this.    Having concern about GERD vs ulcer.  Getting ulcer regularly.  Cannot eat spices, no red sauces.  Needs to drink milk to calm it down.           Review of Systems   Constitutional:  Positive for fatigue and fever. Negative for activity change and unexpected weight change.   HENT:  Negative for congestion, nosebleeds, sinus pressure and sneezing.    Respiratory:  Positive for shortness of breath and wheezing.    Cardiovascular:  Negative for chest pain, palpitations and leg swelling.   Gastrointestinal:  Positive for abdominal pain. Negative for abdominal distention, constipation, diarrhea and nausea.   Genitourinary:  Negative for difficulty urinating and dysuria.   Musculoskeletal:  Negative for back pain and gait problem.   Skin:  Positive for rash. Negative for pallor.   Neurological:  Positive for dizziness and light-headedness. Negative for weakness, numbness and headaches.   Psychiatric/Behavioral:  Negative for agitation. The patient is not nervous/anxious.            Objective:        Vitals:    06/10/24 1343   BP: 118/60   BP Location: Left arm   Patient Position: Sitting   BP Method: Medium (Manual)   Pulse: 64   Resp: 19   SpO2: 97%   Weight: 49 kg (108 lb 2.2 oz)   Height: 5' 3" (1.6 m)       Body mass index is 19.16 kg/m².      Physical Exam  Constitutional:       General: She is not in acute distress.     Appearance: Normal appearance.      Comments: " thin   HENT:      Head: Normocephalic and atraumatic.      Right Ear: External ear normal.      Left Ear: External ear normal.      Nose: No rhinorrhea.      Mouth/Throat:      Mouth: Mucous membranes are moist.      Pharynx: No posterior oropharyngeal erythema.   Eyes:      Extraocular Movements: Extraocular movements intact.      Conjunctiva/sclera: Conjunctivae normal.   Cardiovascular:      Rate and Rhythm: Normal rate and regular rhythm.      Heart sounds: No murmur heard.  Pulmonary:      Effort: No respiratory distress.      Breath sounds: No wheezing.      Comments: Increased work of breathing, using 3 L via nasal cannula.        Diminished lung sounds on back.  Abdominal:      General: Abdomen is flat.   Musculoskeletal:      Right lower leg: No edema.      Left lower leg: No edema.   Skin:     Capillary Refill: Capillary refill takes less than 2 seconds.      Coloration: Skin is not jaundiced.      Findings: No bruising.   Neurological:      General: No focal deficit present.      Mental Status: She is alert and oriented to person, place, and time.      Motor: No weakness.      Gait: Gait normal.   Psychiatric:         Mood and Affect: Mood normal.           Past Medical History:   Diagnosis Date    COPD (chronic obstructive pulmonary disease)     Hyperlipidemia     Hypertension        Lab Results   Component Value Date     05/03/2023    K 4.2 05/03/2023     05/03/2023    CO2 29 05/03/2023    BUN 16 05/03/2023    CREATININE 0.7 05/03/2023    ANIONGAP 10 05/03/2023     Lab Results   Component Value Date    HGBA1C 6.0 (H) 05/03/2023     Lab Results   Component Value Date     (H) 02/05/2023     (H) 01/30/2023     (H) 01/01/2023       Lab Results   Component Value Date    WBC 6.54 05/03/2023    HGB 13.4 05/03/2023    HCT 43.6 05/03/2023    HCT 33 (L) 02/05/2023     05/03/2023    GRAN 4.7 05/03/2023    GRAN 71.7 05/03/2023     Lab Results   Component Value Date    CHOL  145 05/03/2023    HDL 52 05/03/2023    LDLCALC 72.8 05/03/2023    TRIG 101 05/03/2023        Outpatient Encounter Medications as of 6/10/2024   Medication Sig Dispense Refill    apixaban (ELIQUIS) 2.5 mg Tab Take 1 tablet (2.5 mg total) by mouth 2 (two) times daily. 180 tablet 2    diltiaZEM (CARDIZEM CD) 120 MG Cp24 TAKE ONE CAPSULE BY MOUTH ONCE DAILY 90 capsule 3    glycopyrrolate-formoteroL (BEVESPI AEROSPHERE) 9-4.8 mcg HFAA Inhale 2 puffs into the lungs 2 (two) times a day. Controller 10.7 g 5    ipratropium (ATROVENT) 0.02 % nebulizer solution Take 2.5 mLs (500 mcg total) by nebulization every 8 (eight) hours as needed for Wheezing. 150 mL 5    levalbuterol (XOPENEX) 1.25 mg/3 mL nebulizer solution Take 3 mLs (1.25 mg total) by nebulization every 8 (eight) hours as needed for Wheezing or Shortness of Breath. 150 mL 3    metoprolol succinate (TOPROL-XL) 50 MG 24 hr tablet TAKE ONE TABLET BY MOUTH ONCE DAILY 90 tablet 3    nitroGLYCERIN (NITROSTAT) 0.4 MG SL tablet Place 1 tablet (0.4 mg total) under the tongue every 5 (five) minutes as needed for Chest pain. 20 tablet 3    ondansetron (ZOFRAN) 4 MG tablet Use 1 tab by mouth up to 3 times per week.  12 tabs for 1 month supply. 12 tablet 11    [DISCONTINUED] omeprazole (PRILOSEC) 20 MG capsule Take 1 capsule (20 mg total) by mouth once daily. 90 capsule 3    omeprazole (PRILOSEC) 40 MG capsule Take 1 capsule (40 mg total) by mouth once daily. 90 capsule 3    sucralfate (CARAFATE) 1 gram tablet Take 1 tablet (1 g total) by mouth 2 (two) times daily as needed (stomach pain, burning). 60 tablet 5     No facility-administered encounter medications on file as of 6/10/2024.          Assessment:       1. Gastroesophageal reflux disease, unspecified whether esophagitis present    2. Rhinitis, unspecified type    3. S/P CABG (coronary artery bypass graft)    4. COPD exacerbation    5. Health care maintenance    6. Other long term (current) drug therapy           Plan:            Gastroesophageal reflux disease, unspecified whether esophagitis present  -     omeprazole (PRILOSEC) 40 MG capsule; Take 1 capsule (40 mg total) by mouth once daily.  Dispense: 90 capsule; Refill: 3  -     sucralfate (CARAFATE) 1 gram tablet; Take 1 tablet (1 g total) by mouth 2 (two) times daily as needed (stomach pain, burning).  Dispense: 60 tablet; Refill: 5  -     CBC, CMP, A1c, lipid, TSH.  -   Patient having worsening GERD symptoms, history shows hiatal hernia.  -   Increasing PPI from 20 mg daily to 40 mg nightly.  Additionally adding Carafate 2 times daily.      Rhinitis, unspecified type  -     CBC, CMP, A1c, lipid, TSH.  -   Monitor.  -   Patient with issues getting adequate air movement through nostrils/nasal turbinates.  Would like to speak to ENT about possible turbinectomy, dilation or other approach to help improve nasal breathing.      S/P CABG (coronary artery bypass graft)   -   Continue to maintain healthy blood pressure and cholesterol.  Checking lipids.    COPD exacerbation   -   Using respiratory medications as needed.  Tolerating well.    Health care maintenance  -     CBC, CMP, A1c, lipid, TSH.  -         Other long term (current) drug therapy  -     CBC, CMP, A1c, lipid, TSH.  -

## 2024-07-23 ENCOUNTER — OFFICE VISIT (OUTPATIENT)
Dept: CARDIOLOGY | Facility: CLINIC | Age: 84
End: 2024-07-23
Payer: MEDICARE

## 2024-07-23 ENCOUNTER — TELEPHONE (OUTPATIENT)
Dept: CARDIOLOGY | Facility: CLINIC | Age: 84
End: 2024-07-23

## 2024-07-23 VITALS
OXYGEN SATURATION: 94 % | DIASTOLIC BLOOD PRESSURE: 72 MMHG | WEIGHT: 105 LBS | SYSTOLIC BLOOD PRESSURE: 130 MMHG | HEIGHT: 63 IN | HEART RATE: 92 BPM | BODY MASS INDEX: 18.61 KG/M2

## 2024-07-23 DIAGNOSIS — E78.5 HYPERLIPIDEMIA, UNSPECIFIED HYPERLIPIDEMIA TYPE: ICD-10-CM

## 2024-07-23 DIAGNOSIS — I25.10 CORONARY ARTERY DISEASE INVOLVING NATIVE HEART WITHOUT ANGINA PECTORIS, UNSPECIFIED VESSEL OR LESION TYPE: ICD-10-CM

## 2024-07-23 DIAGNOSIS — I10 HYPERTENSION, UNSPECIFIED TYPE: ICD-10-CM

## 2024-07-23 DIAGNOSIS — R06.09 DOE (DYSPNEA ON EXERTION): ICD-10-CM

## 2024-07-23 DIAGNOSIS — I65.29 STENOSIS OF CAROTID ARTERY, UNSPECIFIED LATERALITY: ICD-10-CM

## 2024-07-23 DIAGNOSIS — I48.19 PERSISTENT ATRIAL FIBRILLATION: ICD-10-CM

## 2024-07-23 DIAGNOSIS — I50.32 CHRONIC HEART FAILURE WITH PRESERVED EJECTION FRACTION: ICD-10-CM

## 2024-07-23 DIAGNOSIS — Z95.1 S/P CABG (CORONARY ARTERY BYPASS GRAFT): Primary | ICD-10-CM

## 2024-07-23 DIAGNOSIS — I73.9 PERIPHERAL ARTERY DISEASE: ICD-10-CM

## 2024-07-23 PROCEDURE — 99999 PR PBB SHADOW E&M-EST. PATIENT-LVL IV: CPT | Mod: PBBFAC,HCNC,, | Performed by: INTERNAL MEDICINE

## 2024-07-23 RX ORDER — PRAVASTATIN SODIUM 40 MG/1
40 TABLET ORAL DAILY
Qty: 90 TABLET | Refills: 1 | Status: SHIPPED | OUTPATIENT
Start: 2024-07-23 | End: 2025-07-23

## 2024-07-23 RX ORDER — ISOSORBIDE MONONITRATE 30 MG/1
30 TABLET, EXTENDED RELEASE ORAL DAILY
Qty: 90 TABLET | Refills: 1 | Status: SHIPPED | OUTPATIENT
Start: 2024-07-23 | End: 2025-07-23

## 2024-07-23 NOTE — TELEPHONE ENCOUNTER
----- Message from Lalo Vázquez sent at 7/23/2024  2:10 PM CDT -----  Regarding: Order Clarification  Contact: [phone]  Hi, pt just left the clinic and was ordered a Blood Pressure medication. Pt says she is alr on a Blood Pressure medication and just need to confirm the order. Pls call [phone].    Thank you.

## 2024-07-23 NOTE — PROGRESS NOTES
[facility] [address]  Cardiology Clinic Note      Chief Complaint  Chief Complaint   Patient presents with    Follow-up       HPI:    83-year-old female with past medical history of hiatal hernia, COPD with chronic respiratory failure on home oxygen, hypertension, hyperlipidemia, impaired fasting glucose, carotid stenosis not hemodynamically significant by duplex 02/2024, CAD status post CABG no operative note slidell 2015, peripheral artery disease with occlusion of the right common iliac artery by CT 2023, HFpEF INOCENCIA 02/01/2023 normal EF normal RV while left atrial enlargement mild mitral regurgitation moderate aortic regurgitation mild tricuspid regurgitation no left atrial appendage thrombus aortic plaque visualized prior echo 01/03/2023 normal LVEF mild LVH grade 2 diastolic dysfunction moderate aortic valve regurgitation mild mitral valve regurgitation normal RV mild to moderate tricuspid valve regurgitation PASP 58 CVP 8     Recently hospitalized with decompensated heart failure and atrial fibrillation with RVR status post INOCENCIA cardioversion which was unsuccessful as an inpatient thus amiodarone was discontinued given chronic lung disease course complicated by demand ischemia.  Held the aspirin as patient was on Eliquis.    Remains with chronic dyspnea on exertion on home oxygen due to chronic lung disease.    Sent to electrophysiology for atrial fibrillation    Blood pressure repeated after a period of rest and was 135/70    Patient states blood pressure log is always normal at home    Evidently was taken off losartan the past    Taken off statin possibly due to myalgia will try pravastatin    The patient does say that she has chronic stable dyspnea on exertion as above would like to try a medication    Medications  Current Outpatient Medications   Medication Sig Dispense Refill    apixaban (ELIQUIS) 2.5 mg Tab Take 1 tablet (2.5 mg total) by mouth 2 (two) times daily. 180 tablet 2    diltiaZEM  (CARDIZEM CD) 120 MG Cp24 TAKE ONE CAPSULE BY MOUTH ONCE DAILY 90 capsule 3    glycopyrrolate-formoteroL (BEVESPI AEROSPHERE) 9-4.8 mcg HFAA Inhale 2 puffs into the lungs 2 (two) times a day. Controller 10.7 g 5    ipratropium (ATROVENT) 0.02 % nebulizer solution Take 2.5 mLs (500 mcg total) by nebulization every 8 (eight) hours as needed for Wheezing. 150 mL 5    levalbuterol (XOPENEX) 1.25 mg/3 mL nebulizer solution Take 3 mLs (1.25 mg total) by nebulization every 8 (eight) hours as needed for Wheezing or Shortness of Breath. 150 mL 3    metoprolol succinate (TOPROL-XL) 50 MG 24 hr tablet TAKE ONE TABLET BY MOUTH ONCE DAILY 90 tablet 3    nitroGLYCERIN (NITROSTAT) 0.4 MG SL tablet Place 1 tablet (0.4 mg total) under the tongue every 5 (five) minutes as needed for Chest pain. 20 tablet 3    omeprazole (PRILOSEC) 40 MG capsule Take 1 capsule (40 mg total) by mouth once daily. 90 capsule 3    ondansetron (ZOFRAN) 4 MG tablet Use 1 tab by mouth up to 3 times per week.  12 tabs for 1 month supply. 12 tablet 11    sucralfate (CARAFATE) 1 gram tablet Take 1 tablet (1 g total) by mouth 2 (two) times daily as needed (stomach pain, burning). 60 tablet 5     No current facility-administered medications for this visit.        History  Past Medical History:   Diagnosis Date    COPD (chronic obstructive pulmonary disease)     Hyperlipidemia     Hypertension      Past Surgical History:   Procedure Laterality Date    ESOPHAGOGASTRODUODENOSCOPY N/A 2/1/2023    Procedure: EGD (ESOPHAGOGASTRODUODENOSCOPY);  Surgeon: Gonsalo Esquivel MD;  Location: [facility] ENDO;  Service: Endoscopy;  Laterality: N/A;    gall stone      HEMORRHOID SURGERY      open heart surgery      TRANSESOPHAGEAL ECHOCARDIOGRAM WITH POSSIBLE CARDIOVERSION (INOCENCIA W/ POSS CARDIOVERSION) N/A 2/1/2023    Procedure: Transesophageal echo (INOCENCIA) intra-procedure log documentation;  Surgeon: Pawel Gutierrez MD;  Location: [facility] CATH LAB;  Service: Cardiology;  Laterality: N/A;     TREATMENT OF CARDIAC ARRHYTHMIA N/A 2023    Procedure: Cardioversion or Defibrillation;  Surgeon: Pawel Gutierrez MD;  Location: [facility] CATH LAB;  Service: Cardiology;  Laterality: N/A;     Social History     Socioeconomic History    Marital status:    Tobacco Use    Smoking status: Former     Current packs/day: 0.00     Types: Cigarettes     Quit date: 6/3/2012     Years since quittin.1    Smokeless tobacco: Never   Substance and Sexual Activity    Alcohol use: No    Drug use: Never     Social Determinants of Health     Financial Resource Strain: Low Risk  (2023)    Overall Financial Resource Strain (CARDIA)     Difficulty of Paying Living Expenses: Not hard at all   Food Insecurity: No Food Insecurity (2023)    Hunger Vital Sign     Worried About Running Out of Food in the Last Year: Never true     Ran Out of Food in the Last Year: Never true   Transportation Needs: No Transportation Needs (2023)    PRAPARE - Transportation     Lack of Transportation (Medical): No     Lack of Transportation (Non-Medical): No   Stress: No Stress Concern Present (2023)    [employer] - Occupational Stress Questionnaire     Feeling of Stress : Not at all   Housing Stability: Low Risk  (2023)    Housing Stability Vital Sign     Unable to Pay for Housing in the Last Year: No     Number of Places Lived in the Last Year: 1     Unstable Housing in the Last Year: No     Family History   Problem Relation Name Age of Onset    Cancer Sister      Colon cancer Brother      Lung cancer Neg Hx      Heart attack Neg Hx      Stroke Neg Hx      Alzheimer's disease Neg Hx          Allergies  Review of patient's allergies indicates:   Allergen Reactions    Statins-hmg-coa reductase inhibitors Other (See Comments)     Leg pains        Review of Systems   Review of Systems   Constitutional: Negative for fever.   HENT:  Negative for nosebleeds.    Eyes:  Negative for visual disturbance.    Cardiovascular:  Positive for dyspnea on exertion. Negative for chest pain, claudication, palpitations and syncope.   Respiratory:  Negative for cough, hemoptysis and wheezing.    Endocrine: Negative for cold intolerance, heat intolerance, polyphagia and polyuria.   Hematologic/Lymphatic: Negative for bleeding problem.   Skin:  Negative for rash.   Musculoskeletal:  Negative for myalgias.   Gastrointestinal:  Negative for hematemesis, hematochezia, nausea and vomiting.   Genitourinary:  Negative for dysuria.   Neurological:  Negative for focal weakness and sensory change.   Psychiatric/Behavioral:  Negative for altered mental status.        Physical Exam  Vitals:    07/23/24 1252   BP: 130/72   Pulse: 92     Wt Readings from Last 1 Encounters:   07/23/24 47.6 kg (105 lb)     Physical Exam  Constitutional:       General: She is not in acute distress.  HENT:      Head: Normocephalic and atraumatic.      Mouth/Throat:      Mouth: Mucous membranes are moist.   Eyes:      Extraocular Movements: Extraocular movements intact.      Pupils: Pupils are equal, round, and reactive to light.   Neck:      Vascular: No carotid bruit or JVD.   Cardiovascular:      Rate and Rhythm: Normal rate and regular rhythm.      Heart sounds: No murmur heard.     No friction rub. No gallop.   Pulmonary:      Effort: Pulmonary effort is normal.      Breath sounds: Normal breath sounds.   Abdominal:      Tenderness: There is no abdominal tenderness. There is no guarding or rebound.   Musculoskeletal:      Right lower leg: No edema.      Left lower leg: No edema.   Skin:     General: Skin is warm and dry.      Capillary Refill: Capillary refill takes less than 2 seconds.   Neurological:      General: No focal deficit present.      Mental Status: She is alert.   Psychiatric:         Mood and Affect: Mood normal.         Labs  No visits with results within 6 Month(s) from this visit.   Latest known visit with results is:   Lab Visit on 05/03/2023    Component Date Value Ref Range Status    WBC 05/03/2023 6.54  3.90 - 12.70 K/uL Final    RBC 05/03/2023 4.53  4.00 - 5.40 M/uL Final    Hemoglobin 05/03/2023 13.4  12.0 - 16.0 g/dL Final    Hematocrit 05/03/2023 43.6  37.0 - 48.5 % Final    MCV 05/03/2023 96  82 - 98 fL Final    MCH 05/03/2023 29.6  27.0 - 31.0 pg Final    MCHC 05/03/2023 30.7 (L)  32.0 - 36.0 g/dL Final    RDW 05/03/2023 14.5  11.5 - 14.5 % Final    Platelets 05/03/2023 225  150 - 450 K/uL Final    MPV 05/03/2023 10.6  9.2 - 12.9 fL Final    Immature Granulocytes 05/03/2023 0.2  0.0 - 0.5 % Final    Gran # (ANC) 05/03/2023 4.7  1.8 - 7.7 K/uL Final    Immature Grans (Abs) 05/03/2023 0.01  0.00 - 0.04 K/uL Final    Comment: Mild elevation in immature granulocytes is non specific and   can be seen in a variety of conditions including stress response,   acute inflammation, trauma and pregnancy. Correlation with other   laboratory and clinical findings is essential.      Lymph # 05/03/2023 1.0  1.0 - 4.8 K/uL Final    Mono # 05/03/2023 0.5  0.3 - 1.0 K/uL Final    Eos # 05/03/2023 0.3  0.0 - 0.5 K/uL Final    Baso # 05/03/2023 0.07  0.00 - 0.20 K/uL Final    nRBC 05/03/2023 0  0 /100 WBC Final    Gran % 05/03/2023 71.7  38.0 - 73.0 % Final    Lymph % 05/03/2023 14.8 (L)  18.0 - 48.0 % Final    Mono % 05/03/2023 7.2  4.0 - 15.0 % Final    Eosinophil % 05/03/2023 5.0  0.0 - 8.0 % Final    Basophil % 05/03/2023 1.1  0.0 - 1.9 % Final    Differential Method 05/03/2023 Automated   Final    Sodium 05/03/2023 142  136 - 145 mmol/L Final    Potassium 05/03/2023 4.2  3.5 - 5.1 mmol/L Final    Chloride 05/03/2023 103  95 - 110 mmol/L Final    CO2 05/03/2023 29  23 - 29 mmol/L Final    Glucose 05/03/2023 114 (H)  70 - 110 mg/dL Final    BUN 05/03/2023 16  8 - 23 mg/dL Final    Creatinine 05/03/2023 0.7  0.5 - 1.4 mg/dL Final    Calcium 05/03/2023 9.8  8.7 - 10.5 mg/dL Final    Total Protein 05/03/2023 7.4  6.0 - 8.4 g/dL Final    Albumin 05/03/2023 4.0  3.5 -  5.2 g/dL Final    Total Bilirubin 05/03/2023 0.5  0.1 - 1.0 mg/dL Final    Comment: For infants and newborns, interpretation of results should be based  on gestational age, weight and in agreement with clinical  observations.    Premature Infant recommended reference ranges:  Up to 24 hours.............<8.0 mg/dL  Up to 48 hours............<12.0 mg/dL  3-5 days..................<15.0 mg/dL  6-29 days.................<15.0 mg/dL      Alkaline Phosphatase 05/03/2023 90  55 - 135 U/L Final    AST 05/03/2023 19  10 - 40 U/L Final    ALT 05/03/2023 12  10 - 44 U/L Final    Anion Gap 05/03/2023 10  8 - 16 mmol/L Final    eGFR 05/03/2023 >60.0  >60 mL/min/1.73 m^2 Final    Cholesterol 05/03/2023 145  120 - 199 mg/dL Final    Comment: The National Cholesterol Education Program (NCEP) has set the  following guidelines (reference ranges) for Cholesterol:  Optimal.....................<200 mg/dL  Borderline High.............200-239 mg/dL  High........................> or = 240 mg/dL      Triglycerides 05/03/2023 101  30 - 150 mg/dL Final    Comment: The National Cholesterol Education Program (NCEP) has set the  following guidelines (reference values) for triglycerides:  Normal......................<150 mg/dL  Borderline High.............150-199 mg/dL  High........................200-499 mg/dL      HDL 05/03/2023 52  40 - 75 mg/dL Final    Comment: The National Cholesterol Education Program (NCEP) has set the  following guidelines (reference values) for HDL Cholesterol:  Low...............<40 mg/dL  Optimal...........>60 mg/dL      LDL Cholesterol 05/03/2023 72.8  63.0 - 159.0 mg/dL Final    Comment: The National Cholesterol Education Program (NCEP) has set the  following guidelines (reference values) for LDL Cholesterol:  Optimal.......................<130 mg/dL  Borderline High...............130-159 mg/dL  High..........................160-189 mg/dL  Very High.....................>190 mg/dL      HDL/Cholesterol Ratio  05/03/2023 35.9  20.0 - 50.0 % Final    Total Cholesterol/HDL Ratio 05/03/2023 2.8  2.0 - 5.0 Final    Non-HDL Cholesterol 05/03/2023 93  mg/dL Final    Comment: Risk category and Non-HDL cholesterol goals:  Coronary heart disease (CHD)or equivalent (10-year risk of CHD >20%):  Non-HDL cholesterol goal     <130 mg/dL  Two or more CHD risk factors and 10-year risk of CHD <= 20%:  Non-HDL cholesterol goal     <160 mg/dL  0 to 1 CHD risk factor:  Non-HDL cholesterol goal     <190 mg/dL      Hemoglobin A1C 05/03/2023 6.0 (H)  4.0 - 5.6 % Final    Comment: ADA Screening Guidelines:  5.7-6.4%  Consistent with prediabetes  >or=6.5%  Consistent with diabetes    High levels of fetal hemoglobin interfere with the HbA1C  assay. Heterozygous hemoglobin variants (HbS, HgC, etc)do  not significantly interfere with this assay.   However, presence of multiple variants may affect accuracy.      Estimated Avg Glucose 05/03/2023 126  68 - 131 mg/dL Final    TSH 05/03/2023 0.828  0.400 - 4.000 uIU/mL Final       EKG  03/21/2023 Normal sinus rhythm normal rate borderline abnormal precordial R-wave progression nonspecific ST-T changes  09/22/2023 normal sinus rhythm PAC borderline abnormal precordial R-wave progression subtle nonspecific ST-T changes baseline artifact rightward axis    Echo   Results for orders placed or performed during the hospital encounter of 01/01/23   Echo   Result Value Ref Range    BSA 1.59 m2    TDI SEPTAL 0.05 m/s    LV LATERAL E/E' RATIO 15.83 m/s    LV SEPTAL E/E' RATIO 19.00 m/s    IVC diameter 1.82 cm    Left Ventricular Outflow Tract Mean Velocity 0.52 cm/s    Left Ventricular Outflow Tract Mean Gradient 1.22 mmHg    AORTIC VALVE CUSP SEPERATION 0.96 cm    TDI LATERAL 0.06 m/s    PV PEAK VELOCITY 0.81 cm/s    LVIDd 4.17 3.5 - 6.0 cm    IVS 0.79 0.6 - 1.1 cm    Posterior Wall 1.09 0.6 - 1.1 cm    Ao root annulus 2.46 cm    LVIDs 3.10 2.1 - 4.0 cm    FS 26 28 - 44 %    Sinus 2.67 cm    STJ 2.30 cm     Ascending aorta 2.71 cm    LV mass 124.51 g    LA size 3.89 cm    RVDD 2.05 cm    Left Ventricle Relative Wall Thickness 0.52 cm    AV regurgitation pressure 1/2 time [id number] ms    AV mean gradient 7 mmHg    AV valve area 1.42 cm2    AV Velocity Ratio 0.42     AV index (prosthetic) 0.46     MV valve area p 1/2 method 4.04 cm2    E/A ratio 1.79     Mean e' 0.06 m/s    E wave deceleration time 147.56 msec    LVOT diameter 1.99 cm    LVOT area 3.1 cm2    LVOT peak olegario 0.77 m/s    LVOT peak VTI 18.70 cm    Ao peak olegario 1.82 m/s    Ao VTI 41.0 cm    Mr max olegario 4.98 m/s    LVOT stroke volume 58.13 cm3    AV peak gradient 13 mmHg    E/E' ratio 17.27 m/s    MV Peak E Olegario 0.95 m/s    AR Max Olegario 3.89 m/s    TR Max Olegario 3.54 m/s    MV stenosis pressure 1/2 time 54.43 ms    MV Peak A Olegario 0.53 m/s    LV Systolic Volume 38.04 mL    LV Systolic Volume Index 23.9 mL/m2    LV Diastolic Volume 77.17 mL    LV Diastolic Volume Index 48.53 mL/m2    LV Mass Index 78 g/m2    RA Major Axis 3.96 cm    Left Atrium Minor Axis 3.97 cm    Left Atrium Major Axis 5.41 cm    Triscuspid Valve Regurgitation Peak Gradient 50 mmHg    LA Volume Index (Mod) 37.2 mL/m2    LA volume (mod) 59.19 cm3    RA Width 2.82 cm    Right Atrial Pressure (from IVC) 8 mmHg    EF 55 %    TV resting pulmonary artery pressure 58 mmHg    Narrative    · The left ventricle is normal in size with mild concentric hypertrophy   and normal systolic function.  · The estimated ejection fraction is 55%.  · Grade II left ventricular diastolic dysfunction.  · Mild left atrial enlargement.  · Moderate aortic regurgitation.  · Mild mitral regurgitation.  · Normal right ventricular size with normal right ventricular systolic   function.  · Mild to moderate tricuspid regurgitation.  · There is pulmonary hypertension.  · The estimated PA systolic pressure is 58 mmHg.  · Intermediate central venous pressure (8 mmHg).          Imaging  No results found.    Prior coronary  angiogram / intervention:  See HPI    Assessment and Plan  1. Atherosclerosis of native coronary artery of native heart possibly with angina pectoris  Continue anticoagulation with adjusted dose Eliquis, statin, beta-blocker  Okay to hold aspirin while on anticoagulation  Empiric trial of Imdur    2. S/P CABG (coronary artery bypass graft)  As above    3.  Paroxysmal atrial fibrillation  Continue adjusted dose Eliquis in addition to diltiazem and metoprolol    4. Chronic heart failure with preserved ejection fraction  Euvolemic off diuretics  Wt Readings from Last 3 Encounters:   07/23/24 1252 47.6 kg (105 lb)   06/10/24 1343 49 kg (108 lb 2.2 oz)   11/28/23 1321 47.6 kg (105 lb)        5. Hypertension, unspecified type  Calcium channel blocker, beta-blocker  Home blood pressure log    6. Hyperlipidemia, unspecified hyperlipidemia type  Switch statin to pravastatin and titrate as tolerated    7. Stenosis of carotid artery, unspecified laterality  Let hemodynamically significant by ultrasound 02/2024    8. Peripheral artery disease  Continue anticoagulation with statin    9. Chronic stable dyspnea on exertion  Likely due to lung disease but will start Imdur empirically  The patient may stop Imdur if ineffective       Follow Up  3 months  Lipid panel at that time    Total professional time spent for the encounter: 40 minutes  Time was spent preparing to see the patient, reviewing results of prior testing, obtaining and/or reviewing separately obtained history, performing a medically appropriate examination and interview, counseling and educating the patient/family, ordering medications/tests/procedures, referring and communicating with other health care professionals, documenting clinical information in the electronic health record, and independently interpreting results.     Pawel Gutierrez MD, FACC, RPVI  Interventional Cardiology

## 2024-07-23 NOTE — TELEPHONE ENCOUNTER
Spoke with patient's daughter, informed that she was prescribed imdur for CAD and provider is aware she is also on metoprolol.  She verbalized understanding.

## 2024-08-09 DIAGNOSIS — J43.8 OTHER EMPHYSEMA: ICD-10-CM

## 2024-08-09 DIAGNOSIS — J44.1 COPD EXACERBATION: ICD-10-CM

## 2024-08-09 RX ORDER — LEVALBUTEROL INHALATION SOLUTION 1.25 MG/3ML
1 SOLUTION RESPIRATORY (INHALATION) EVERY 8 HOURS PRN
Qty: 225 ML | Refills: 3 | Status: SHIPPED | OUTPATIENT
Start: 2024-08-09

## 2024-08-13 ENCOUNTER — TELEPHONE (OUTPATIENT)
Dept: PRIMARY CARE CLINIC | Facility: CLINIC | Age: 84
End: 2024-08-13
Payer: MEDICARE

## 2024-08-13 NOTE — TELEPHONE ENCOUNTER
----- Message from Chiki Roman sent at 8/13/2024  1:00 PM CDT -----  Regarding: Oxygen  Contact: Pt [phone]  [email or web address]     Patient is calling for Medical Advice regarding: Patient called and wants a follow up on when he oxygen is going to be delivered. [facility] is waiting on approval according to patient. She said she would like a callback to discuss this situation. Patient is bedridden and needs the oxygen. Please call back asap.    How long has patient had these symptoms:    Pharmacy name and phone#:    Patient wants a call back or thru [facility]: Call    Comments:

## 2024-08-13 NOTE — TELEPHONE ENCOUNTER
Spoke to Mahsa at [facility].  She had the wrong fax number.  I gave her [phone] and she will forward paperwork to me now.

## 2024-08-13 NOTE — TELEPHONE ENCOUNTER
----- Message from Triston Valenzuela sent at 8/8/2024  1:04 PM CDT -----  Contact: Pt [phone]  She need a new order for oxygen home unit to be sent to [facility] at Phone number [phone] because her health insurance changed and Theo said they reached out to your office    Thank you

## 2024-08-13 NOTE — TELEPHONE ENCOUNTER
Spoke to patient to let her know Theo had our wrong fax number.  I explained to her that we will fill out the paperwork and send it back as soon as possible.

## 2024-08-13 NOTE — TELEPHONE ENCOUNTER
----- Message from Mar Charles sent at 8/13/2024  4:23 PM CDT -----  Contact: Agueda/Marva/[phone]   returning a phone call.    Who left a message for the patient: Carola     Does patient know what this is regarding:  yes    Would you like a call back, or a response through your [facility] portal?:   call back     Comments: Marva stated that she spoke with the patient and patient confirmed that she still has [employer] insurance she is also confirming that the fax she sent over was received . Please advise

## 2024-08-13 NOTE — TELEPHONE ENCOUNTER
SUZETTEM for Marva to let her know I did receive the fax and will fax it back as soon as Dr. Ruiz signs it.

## 2024-08-15 DIAGNOSIS — I48.19 PERSISTENT ATRIAL FIBRILLATION: ICD-10-CM

## 2024-08-15 RX ORDER — APIXABAN 2.5 MG/1
2.5 TABLET, FILM COATED ORAL 2 TIMES DAILY
Qty: 180 TABLET | Refills: 0 | Status: ON HOLD | OUTPATIENT
Start: 2024-08-15

## 2024-08-21 ENCOUNTER — PATIENT MESSAGE (OUTPATIENT)
Dept: ADMINISTRATIVE | Facility: CLINIC | Age: 84
End: 2024-08-21
Payer: MEDICARE

## 2024-08-21 ENCOUNTER — PATIENT OUTREACH (OUTPATIENT)
Dept: ADMINISTRATIVE | Facility: CLINIC | Age: 84
End: 2024-08-21
Payer: MEDICARE

## 2024-08-21 ENCOUNTER — TELEPHONE (OUTPATIENT)
Dept: PRIMARY CARE CLINIC | Facility: CLINIC | Age: 84
End: 2024-08-21
Payer: MEDICARE

## 2024-08-21 NOTE — TELEPHONE ENCOUNTER
----- Message from Bambi Duncan sent at 8/21/2024  9:29 AM CDT -----  Contact: [phone]@[email or web address]  Good morning patient daughter Ms. Zuniga would like to request a oxygen machine for her mother. Please call  to advise [phone]

## 2024-08-21 NOTE — PROGRESS NOTES
C3 nurse attempted to contact Ann Marie GHOSH Joycelyn for a TCC post hospital discharge follow up call. No answer. Left voicemail with callback information. The patient has a scheduled HOSFU appointment with Estevan Ruiz MD on 09/05/2024 @ 1872.

## 2024-08-22 NOTE — PROGRESS NOTES
C3 nurse spoke with Ann Marie Hirsch (daughter, Nadya) for a TCC post hospital discharge follow up call. The patient has a scheduled HOSFU appointment with Estevan Ruiz MD on 09/05/2024 @ 8781.

## 2024-08-29 NOTE — TELEPHONE ENCOUNTER
Patient is on home oxygen @ 3lpm , patient is wanting a portable concentrator for when she has to leave the house.  Please discuss at next scheduled appt

## 2024-09-03 ENCOUNTER — TELEPHONE (OUTPATIENT)
Dept: CARDIOLOGY | Facility: CLINIC | Age: 84
End: 2024-09-03
Payer: MEDICARE

## 2024-09-03 NOTE — TELEPHONE ENCOUNTER
----- Message from Ariana Castrejon sent at 9/3/2024 10:41 AM CDT -----  Regarding: pt advice  Contact: Pt [phone]  Patient calling to schedule hospital follow up. Pls call

## 2024-09-03 NOTE — TELEPHONE ENCOUNTER
Spoke with patient, scheduled hospital follow up appointment for 09/06/2024.  Patient verbalized understanding.

## 2024-09-06 ENCOUNTER — OFFICE VISIT (OUTPATIENT)
Dept: CARDIOLOGY | Facility: CLINIC | Age: 84
End: 2024-09-06
Payer: MEDICARE

## 2024-09-06 VITALS
SYSTOLIC BLOOD PRESSURE: 124 MMHG | DIASTOLIC BLOOD PRESSURE: 60 MMHG | BODY MASS INDEX: 19.64 KG/M2 | HEIGHT: 63 IN | WEIGHT: 110.88 LBS | OXYGEN SATURATION: 95 %

## 2024-09-06 DIAGNOSIS — I48.19 PERSISTENT ATRIAL FIBRILLATION: ICD-10-CM

## 2024-09-06 DIAGNOSIS — E78.5 HYPERLIPIDEMIA, UNSPECIFIED HYPERLIPIDEMIA TYPE: ICD-10-CM

## 2024-09-06 DIAGNOSIS — I65.29 STENOSIS OF CAROTID ARTERY, UNSPECIFIED LATERALITY: ICD-10-CM

## 2024-09-06 DIAGNOSIS — Z95.1 S/P CABG (CORONARY ARTERY BYPASS GRAFT): Primary | ICD-10-CM

## 2024-09-06 DIAGNOSIS — I25.10 CORONARY ARTERY DISEASE INVOLVING NATIVE HEART WITHOUT ANGINA PECTORIS, UNSPECIFIED VESSEL OR LESION TYPE: ICD-10-CM

## 2024-09-06 DIAGNOSIS — I50.33 ACUTE ON CHRONIC HEART FAILURE WITH PRESERVED EJECTION FRACTION: ICD-10-CM

## 2024-09-06 DIAGNOSIS — J96.10 CHRONIC RESPIRATORY FAILURE, UNSPECIFIED WHETHER WITH HYPOXIA OR HYPERCAPNIA: ICD-10-CM

## 2024-09-06 DIAGNOSIS — R09.89 CAROTID BRUIT, UNSPECIFIED LATERALITY: ICD-10-CM

## 2024-09-06 DIAGNOSIS — I73.9 PERIPHERAL ARTERY DISEASE: ICD-10-CM

## 2024-09-06 DIAGNOSIS — I10 HYPERTENSION, UNSPECIFIED TYPE: ICD-10-CM

## 2024-09-06 PROCEDURE — 99999 PR PBB SHADOW E&M-EST. PATIENT-LVL III: CPT | Mod: PBBFAC,HCNC,, | Performed by: INTERNAL MEDICINE

## 2024-09-06 RX ORDER — PRAVASTATIN SODIUM 10 MG/1
10 TABLET ORAL DAILY
Qty: 90 TABLET | Refills: 0 | Status: SHIPPED | OUTPATIENT
Start: 2024-09-06 | End: 2025-09-06

## 2024-09-06 NOTE — PROGRESS NOTES
[facility] [address]  Cardiology Clinic Note      Chief Complaint  Chief Complaint   Patient presents with    Hospital Follow Up       HPI:    83-year-old female with past medical history of hiatal hernia, COPD with chronic respiratory failure on home oxygen, hypertension, hyperlipidemia, impaired fasting glucose, carotid stenosis not hemodynamically significant by duplex 02/2024, paroxysmal atrial fibrillation status post cardioversion 02/2023, CAD status post CABG no operative note [place] 2015, peripheral artery disease with occlusion of the right common iliac artery by CT 2023, HFpEF echo 08/2024 normal EF grade 2 diastolic dysfunction mild aortic regurgitation aortic valve sclerosis mild-to-moderate mitral regurgitation borderline/mild right ventricular enlargement with reduced systolic function by TAPSE mild-to-moderate tricuspid regurgitation PASP 55 CVP 3 prior INOCENCIA 02/01/2023 normal EF normal RV while left atrial enlargement mild mitral regurgitation moderate aortic regurgitation mild tricuspid regurgitation no left atrial appendage thrombus aortic plaque visualized prior echo 01/03/2023 normal LVEF mild LVH grade 2 diastolic dysfunction moderate aortic valve regurgitation mild mitral valve regurgitation normal RV mild to moderate tricuspid valve regurgitation PASP 58 CVP 8      Recently hospitalized with decompensated heart failure and atrial fibrillation with RVR status post INOCENCIA cardioversion which was unsuccessful as an inpatient thus amiodarone was discontinued given chronic lung disease course complicated by demand ischemia.  Held the aspirin as patient was on Eliquis.    Remains with chronic dyspnea on exertion on home oxygen due to chronic lung disease.    Sent to electrophysiology for atrial fibrillation    Blood pressure repeated after a period of rest and was 135/70    Patient states blood pressure log is always normal at home    Evidently was taken off losartan the past    Taken off  statin possibly due to myalgia will try pravastatin    The patient does say that she has chronic stable dyspnea on exertion empiric trial of Imdur last visit    Since hospitalized for respiratory failure complicated by demand ischemia due with IV steroids and furosemide    Medications  Current Outpatient Medications   Medication Sig Dispense Refill    ALPRAZolam (XANAX) 0.25 MG tablet Take 1 tablet (0.25 mg total) by mouth 3 (three) times daily as needed for Anxiety. 15 tablet 0    apixaban (ELIQUIS) 2.5 mg Tab Take 1 tablet (2.5 mg total) by mouth 2 (two) times daily. 180 tablet 0    aspirin (ECOTRIN) 81 MG EC tablet Take 1 tablet (81 mg total) by mouth once daily. 30 tablet 0    diltiaZEM (CARDIZEM CD) 120 MG Cp24 TAKE ONE CAPSULE BY MOUTH ONCE DAILY 90 capsule 3    ipratropium (ATROVENT) 0.02 % nebulizer solution Take 2.5 mLs (500 mcg total) by nebulization every 8 (eight) hours as needed for Wheezing. 150 mL 5    isosorbide mononitrate (IMDUR) 30 MG 24 hr tablet Take 1 tablet (30 mg total) by mouth once daily. 90 tablet 1    levalbuterol (XOPENEX) 1.25 mg/3 mL nebulizer solution Take 3 mLs (1.25 mg total) by nebulization every 8 (eight) hours as needed for Wheezing or Shortness of Breath. 225 mL 3    metoprolol succinate (TOPROL-XL) 50 MG 24 hr tablet TAKE ONE TABLET BY MOUTH ONCE DAILY 90 tablet 3    nitroGLYCERIN (NITROSTAT) 0.4 MG SL tablet Place 1 tablet (0.4 mg total) under the tongue every 5 (five) minutes as needed for Chest pain. 20 tablet 3    omeprazole (PRILOSEC) 40 MG capsule Take 1 capsule (40 mg total) by mouth once daily. 90 capsule 3    ondansetron (ZOFRAN) 4 MG tablet Use 1 tab by mouth up to 3 times per week.  12 tabs for 1 month supply. 12 tablet 11    sucralfate (CARAFATE) 1 gram tablet Take 1 tablet (1 g total) by mouth 2 (two) times daily as needed (stomach pain, burning). 60 tablet 5     No current facility-administered medications for this visit.        History  Past Medical History:    Diagnosis Date    COPD (chronic obstructive pulmonary disease)     Hyperlipidemia     Hypertension      Past Surgical History:   Procedure Laterality Date    ESOPHAGOGASTRODUODENOSCOPY N/A 2023    Procedure: EGD (ESOPHAGOGASTRODUODENOSCOPY);  Surgeon: Gonsalo Esquivel MD;  Location: [facility] ENDO;  Service: Endoscopy;  Laterality: N/A;    gall stone      HEMORRHOID SURGERY      open heart surgery      TRANSESOPHAGEAL ECHOCARDIOGRAM WITH POSSIBLE CARDIOVERSION (INOCENCIA W/ POSS CARDIOVERSION) N/A 2023    Procedure: Transesophageal echo (INOCENCIA) intra-procedure log documentation;  Surgeon: Pawel Gutierrez MD;  Location: [facility] CATH LAB;  Service: Cardiology;  Laterality: N/A;    TREATMENT OF CARDIAC ARRHYTHMIA N/A 2023    Procedure: Cardioversion or Defibrillation;  Surgeon: Pawel Gutierrez MD;  Location: [facility] CATH LAB;  Service: Cardiology;  Laterality: N/A;     Social History     Socioeconomic History    Marital status:    Tobacco Use    Smoking status: Former     Current packs/day: 0.00     Types: Cigarettes     Quit date: 6/3/2012     Years since quittin.2    Smokeless tobacco: Never   Substance and Sexual Activity    Alcohol use: No    Drug use: Never     Social Determinants of Health     Financial Resource Strain: Low Risk  (2024)    Overall Financial Resource Strain (CARDIA)     Difficulty of Paying Living Expenses: Not hard at all   Food Insecurity: No Food Insecurity (2024)    Hunger Vital Sign     Worried About Running Out of Food in the Last Year: Never true     Ran Out of Food in the Last Year: Never true   Transportation Needs: No Transportation Needs (2024)    TRANSPORTATION NEEDS     Transportation : No   Stress: Stress Concern Present (2024)    [employer] [place] [employer] - Occupational Stress Questionnaire     Feeling of Stress : To some extent   Housing Stability: Low Risk  (2024)    Housing Stability Vital Sign     Unable to Pay for Housing in the Last  Year: No     Homeless in the Last Year: No     Family History   Problem Relation Name Age of Onset    Cancer Sister      Colon cancer Brother      Lung cancer Neg Hx      Heart attack Neg Hx      Stroke Neg Hx      Alzheimer's disease Neg Hx          Allergies  Review of patient's allergies indicates:   Allergen Reactions    Statins-hmg-coa reductase inhibitors Other (See Comments)     Leg pains        Review of Systems   Review of Systems   Constitutional: Negative for fever.   HENT:  Negative for nosebleeds.    Eyes:  Negative for visual disturbance.   Cardiovascular:  Positive for dyspnea on exertion. Negative for chest pain, claudication, palpitations and syncope.   Respiratory:  Negative for cough, hemoptysis and wheezing.    Endocrine: Negative for cold intolerance, heat intolerance, polyphagia and polyuria.   Hematologic/Lymphatic: Negative for bleeding problem.   Skin:  Negative for rash.   Musculoskeletal:  Negative for myalgias.   Gastrointestinal:  Negative for hematemesis, hematochezia, nausea and vomiting.   Genitourinary:  Negative for dysuria.   Neurological:  Negative for focal weakness and sensory change.   Psychiatric/Behavioral:  Negative for altered mental status.        Physical Exam  Vitals:    09/06/24 1308   BP: 124/60     Wt Readings from Last 1 Encounters:   09/06/24 50.3 kg (110 lb 14.3 oz)     Physical Exam  Constitutional:       General: She is not in acute distress.  HENT:      Head: Normocephalic and atraumatic.      Mouth/Throat:      Mouth: Mucous membranes are moist.   Eyes:      Extraocular Movements: Extraocular movements intact.      Pupils: Pupils are equal, round, and reactive to light.   Neck:      Vascular: No carotid bruit or JVD.   Cardiovascular:      Rate and Rhythm: Normal rate and regular rhythm.      Heart sounds: No murmur heard.     No friction rub. No gallop.   Pulmonary:      Effort: Pulmonary effort is normal.      Breath sounds: Normal breath sounds.   Abdominal:       Tenderness: There is no abdominal tenderness. There is no guarding or rebound.   Musculoskeletal:      Right lower leg: No edema.      Left lower leg: No edema.   Skin:     General: Skin is warm and dry.      Capillary Refill: Capillary refill takes less than 2 seconds.   Neurological:      General: No focal deficit present.      Mental Status: She is alert.   Psychiatric:         Mood and Affect: Mood normal.         Labs  No results displayed because visit has over 200 results.          EKG  Reviewed prior    Echo   Results for orders placed or performed during the hospital encounter of 08/17/24   Echo   Result Value Ref Range    BSA 1.5 m2    A4C EF 48 %    LVOT stroke volume 51.88 cm3    LVIDd 3.34 (A) 3.5 - 6.0 cm    LV Systolic Volume 35.28 mL    LV Systolic Volume Index 23.4 mL/m2    LVIDs 3.01 2.1 - 4.0 cm    LV Diastolic Volume 45.31 mL    LV Diastolic Volume Index 30.01 mL/m2    LV EDV A4C 45.84 mL    Left Ventricular End Systolic Volume by Teichholz Method 35.28 mL    Left Ventricular End Diastolic Volume by Teichholz Method 45.31 mL    IVS 0.96 0.6 - 1.1 cm    LVOT diameter 1.88 cm    LVOT area 2.8 cm2    FS 10 (A) 28 - 44 %    Left Ventricle Relative Wall Thickness 0.64 cm    Posterior Wall 1.07 0.6 - 1.1 cm    LV mass 98.44 g    LV Mass Index 65 g/m2    MV Peak E Olegario 0.90 m/s    TDI LATERAL 0.07 m/s    TDI SEPTAL 0.05 m/s    E/E' ratio 15.00 m/s    MV Peak A Olegario 0.55 m/s    TR Max Olegario 3.6 m/s    E/A ratio 1.64     E wave deceleration time 190.02 msec    LV SEPTAL E/E' RATIO 18.00 m/s    LV LATERAL E/E' RATIO 12.86 m/s    PV Peak S Olegario 0.29 m/s    PV Peak D Olegario 0.60 m/s    Pulm vein S/D ratio 0.48     LVOT peak olegario 0.79 m/s    Left Ventricular Outflow Tract Mean Velocity 0.53 cm/s    Left Ventricular Outflow Tract Mean Gradient 1.24 mmHg    RV/LV Ratio 0.69 cm    LA size 3.95 cm    Left Atrium Minor Axis 4.97 cm    Left Atrium Major Axis 6.22 cm    RA Major Axis 4.08 cm    AV regurgitation pressure  1/2 time [id number] ms    AR Max Olegario 3.33 m/s    AV mean gradient 6 mmHg    AV peak gradient 13 mmHg    Ao peak olegario 1.80 m/s    Ao VTI 38.10 cm    LVOT peak VTI 18.70 cm    AV valve area 1.36 cm²    AV Velocity Ratio 0.44     AV index (prosthetic) 0.49     ELISABET by Velocity Ratio 1.22 cm²    Mr max olegario 5.82 m/s    MV mean gradient 2 mmHg    MV peak gradient 4 mmHg    MV stenosis pressure 1/2 time 57.19 ms    MV valve area p 1/2 method 3.85 cm2    MV valve area by continuity eq 2.15 cm2    MV VTI 24.1 cm    Triscuspid Valve Regurgitation Peak Gradient 52 mmHg    PV PEAK VELOCITY 0.90 m/s    PV peak gradient 3 mmHg    Pulmonary Valve Mean Velocity 0.67 m/s    Ao root annulus 2.70 cm    STJ 2.10 cm    Ascending aorta 2.10 cm    IVC diameter 1.72 cm    Mean e' 0.06 m/s    ZLVIDS 0.69     ZLVIDD -2.85     RVDD 2.32 cm    AORTIC VALVE CUSP SEPERATION 1.46 cm    LA Volume Index 43.0 mL/m2    LA volume 64.93 cm3    LA volume (mod) 63.00 cm3    LA WIDTH 3.5 cm    LA Volume Index (Mod) 41.7 mL/m2    RA Width 3.0 cm    TV resting pulmonary artery pressure 55 mmHg    RV TB RVSP 7 mmHg    Est. RA pres 3 mmHg    Narrative      Left Ventricle: The left ventricle is normal in size. Normal wall   thickness. Possible basal inferolateral hypokinesis. There is normal   systolic function with a visually estimated ejection fraction of 55 - 60%.   Grade II diastolic dysfunction.    Left Atrium: Left atrium is mildly dilated.    Aortic Valve: There is aortic valve sclerosis. Mildly calcified cusps.   There is mild aortic regurgitation.    Mitral Valve: There is no stenosis. There is mild to moderate   regurgitation.    Right Ventricle: Right ventricle was not well visualized due to poor   acoustic window. Borderline/mild right ventricular enlargement. Systolic   function is reduced by TAPSE    Tricuspid Valve: There is mild to moderate regurgitation.    The pulmonary artery systolic pressure estimated at 55 mmHg.    IVC/SVC: Normal  venous pressure at 3 mmHg.         Imaging  No results found.    Prior coronary angiogram / intervention:  See HPI    Assessment and Plan  1. Atherosclerosis of native coronary artery of native heart possibly with angina pectoris  Continue anticoagulation with adjusted dose Eliquis, statin, beta-blocker  Okay to hold aspirin while on anticoagulation  Empiric trial of Imdur  I believe symptoms are due to her chronic respiratory failure so I have referred to pulmonology    2. S/P CABG (coronary artery bypass graft)  As above    3.  Paroxysmal atrial fibrillation  Continue adjusted dose Eliquis in addition to diltiazem and metoprolol    4. Chronic heart failure with preserved ejection fraction  Euvolemic off diuretics  Wt Readings from Last 3 Encounters:   09/06/24 1308 50.3 kg (110 lb 14.3 oz)   08/19/24 0819 50.3 kg (111 lb)   08/19/24 0323 50.5 kg (111 lb 5.3 oz)   08/18/24 0600 48.8 kg (107 lb 9.4 oz)   08/17/24 1053 45.8 kg (100 lb 15.5 oz)   08/17/24 0755 46.3 kg (102 lb)   07/23/24 1252 47.6 kg (105 lb)        5. Hypertension, unspecified type  Calcium channel blocker, beta-blocker, imdur  Home blood pressure log    6. Hyperlipidemia, unspecified hyperlipidemia type  Pravastatin and titrate as tolerated  Could not tolerate other statins  Check lipids consider titration of pravastatin    7. Stenosis of carotid artery, unspecified laterality  Let hemodynamically significant by ultrasound 02/2024    8. Peripheral artery disease  Continue anticoagulation with statin    9. Chronic stable dyspnea on exertion  Likely due to lung disease but will start Imdur empirically  The patient may stop Imdur if ineffective       Follow Up  3 months    Total professional time spent for the encounter: 30 minutes  Time was spent preparing to see the patient, reviewing results of prior testing, obtaining and/or reviewing separately obtained history, performing a medically appropriate examination and interview, counseling and educating  the patient/family, ordering medications/tests/procedures, referring and communicating with other health care professionals, documenting clinical information in the electronic health record, and independently interpreting results.     Pawel Gutierrez MD, FACC, RPVI  Interventional Cardiology

## 2024-09-19 ENCOUNTER — PATIENT MESSAGE (OUTPATIENT)
Dept: PRIMARY CARE CLINIC | Facility: CLINIC | Age: 84
End: 2024-09-19
Payer: MEDICARE

## 2024-10-21 DIAGNOSIS — I25.10 CORONARY ARTERY DISEASE INVOLVING NATIVE HEART WITHOUT ANGINA PECTORIS, UNSPECIFIED VESSEL OR LESION TYPE: ICD-10-CM

## 2024-10-22 RX ORDER — PRAVASTATIN SODIUM 40 MG/1
40 TABLET ORAL DAILY
Qty: 90 TABLET | Refills: 1 | OUTPATIENT
Start: 2024-10-22 | End: 2025-10-22

## 2024-10-22 RX ORDER — ISOSORBIDE MONONITRATE 30 MG/1
30 TABLET, EXTENDED RELEASE ORAL DAILY
Qty: 90 TABLET | Refills: 0 | Status: SHIPPED | OUTPATIENT
Start: 2024-10-22 | End: 2025-10-22

## 2024-11-18 DIAGNOSIS — K21.9 GASTROESOPHAGEAL REFLUX DISEASE, UNSPECIFIED WHETHER ESOPHAGITIS PRESENT: ICD-10-CM

## 2024-11-19 RX ORDER — SUCRALFATE 1 G/1
1 TABLET ORAL 2 TIMES DAILY PRN
Qty: 60 TABLET | Refills: 5 | Status: SHIPPED | OUTPATIENT
Start: 2024-11-19

## 2024-11-19 NOTE — TELEPHONE ENCOUNTER
Refill Routing Note   Medication(s) are not appropriate for processing by [facility] for the following reason(s):        Outside of protocol    [facility] action(s):  Route             Appointments  past 12m or future 3m with PCP    Date Provider   Last Visit   6/10/2024 Estevan Ruiz MD   Next Visit   12/10/2024 Estevan Ruiz MD   ED visits in past 90 days: 0        Note composed:12:47 PM 11/19/2024

## 2024-12-10 ENCOUNTER — TELEPHONE (OUTPATIENT)
Dept: PRIMARY CARE CLINIC | Facility: CLINIC | Age: 84
End: 2024-12-10
Payer: MEDICARE

## 2024-12-10 DIAGNOSIS — J43.8 OTHER EMPHYSEMA: ICD-10-CM

## 2024-12-10 DIAGNOSIS — J96.11 CHRONIC RESPIRATORY FAILURE WITH HYPOXIA: Primary | ICD-10-CM

## 2024-12-10 NOTE — TELEPHONE ENCOUNTER
Pt states insurance will not levalbuterol (XOPENEX) 1.25 mg/3 mL nebulizer solution but will cover Albuterol     Patient is bedridden and had to cx her appointment for today

## 2024-12-10 NOTE — TELEPHONE ENCOUNTER
----- Message from Floresita sent at 12/9/2024  2:49 PM CST -----  Patient is bedridden and had to cx her appointment for tomorrow ask to speak to a nurse about her meds

## 2024-12-11 RX ORDER — ALBUTEROL SULFATE 1.25 MG/3ML
1.25 SOLUTION RESPIRATORY (INHALATION) EVERY 6 HOURS PRN
Qty: 75 ML | Refills: 3 | Status: SHIPPED | OUTPATIENT
Start: 2024-12-11 | End: 2025-12-11

## 2024-12-28 PROBLEM — I50.9 ACUTE CONGESTIVE HEART FAILURE: Status: ACTIVE | Noted: 2024-12-28

## 2024-12-28 PROBLEM — R07.9 CHEST PAIN: Status: ACTIVE | Noted: 2024-12-28

## 2024-12-28 PROBLEM — I48.91 ATRIAL FIBRILLATION WITH RVR: Status: ACTIVE | Noted: 2024-12-28

## 2024-12-30 ENCOUNTER — TELEPHONE (OUTPATIENT)
Dept: PRIMARY CARE CLINIC | Facility: CLINIC | Age: 84
End: 2024-12-30
Payer: MEDICARE

## 2024-12-30 DIAGNOSIS — J44.1 COPD EXACERBATION: ICD-10-CM

## 2024-12-30 DIAGNOSIS — J96.22 ACUTE ON CHRONIC RESPIRATORY FAILURE WITH HYPOXIA AND HYPERCAPNIA: ICD-10-CM

## 2024-12-30 DIAGNOSIS — J96.21 ACUTE ON CHRONIC RESPIRATORY FAILURE WITH HYPOXIA AND HYPERCAPNIA: ICD-10-CM

## 2024-12-30 DIAGNOSIS — I50.31 ACUTE DIASTOLIC HEART FAILURE: Primary | ICD-10-CM

## 2024-12-30 NOTE — TELEPHONE ENCOUNTER
----- Message from Bambi sent at 12/27/2024  2:15 PM CST -----  Contact: [phone]  Good afternoon patient daughter Ms. Zuniga would like a  call back to discuss getting hospice for her mother. Please call Ms. Zuniga to advise [phone]

## 2025-01-30 DIAGNOSIS — Z00.00 ENCOUNTER FOR MEDICARE ANNUAL WELLNESS EXAM: ICD-10-CM

## 2025-02-12 DIAGNOSIS — Z78.0 MENOPAUSE: ICD-10-CM
